# Patient Record
Sex: FEMALE | Race: BLACK OR AFRICAN AMERICAN | NOT HISPANIC OR LATINO
[De-identification: names, ages, dates, MRNs, and addresses within clinical notes are randomized per-mention and may not be internally consistent; named-entity substitution may affect disease eponyms.]

---

## 2020-08-14 ENCOUNTER — APPOINTMENT (OUTPATIENT)
Dept: OBGYN | Facility: CLINIC | Age: 35
End: 2020-08-14
Payer: MEDICAID

## 2020-08-14 PROCEDURE — 0502F SUBSEQUENT PRENATAL CARE: CPT

## 2020-08-17 ENCOUNTER — APPOINTMENT (OUTPATIENT)
Dept: OBGYN | Facility: CLINIC | Age: 35
End: 2020-08-17
Payer: MEDICAID

## 2020-08-17 PROCEDURE — 0502F SUBSEQUENT PRENATAL CARE: CPT

## 2020-08-17 PROCEDURE — 99213 OFFICE O/P EST LOW 20 MIN: CPT

## 2020-08-20 PROBLEM — Z00.00 ENCOUNTER FOR PREVENTIVE HEALTH EXAMINATION: Status: ACTIVE | Noted: 2020-08-20

## 2020-08-21 ENCOUNTER — LABORATORY RESULT (OUTPATIENT)
Age: 35
End: 2020-08-21

## 2020-08-21 ENCOUNTER — APPOINTMENT (OUTPATIENT)
Dept: HEMATOLOGY ONCOLOGY | Facility: CLINIC | Age: 35
End: 2020-08-21
Payer: MEDICAID

## 2020-08-21 VITALS
HEIGHT: 67 IN | DIASTOLIC BLOOD PRESSURE: 75 MMHG | TEMPERATURE: 97.1 F | SYSTOLIC BLOOD PRESSURE: 114 MMHG | HEART RATE: 83 BPM | BODY MASS INDEX: 27.62 KG/M2 | WEIGHT: 176 LBS | RESPIRATION RATE: 14 BRPM

## 2020-08-21 DIAGNOSIS — Z87.898 PERSONAL HISTORY OF OTHER SPECIFIED CONDITIONS: ICD-10-CM

## 2020-08-21 LAB
ALBUMIN SERPL ELPH-MCNC: 3.5 G/DL
ALP BLD-CCNC: 136 U/L
ALT SERPL-CCNC: 7 U/L
ANION GAP SERPL CALC-SCNC: 13 MMOL/L
AST SERPL-CCNC: 15 U/L
BILIRUB SERPL-MCNC: 0.3 MG/DL
BUN SERPL-MCNC: 4 MG/DL
CALCIUM SERPL-MCNC: 9.3 MG/DL
CHLORIDE SERPL-SCNC: 103 MMOL/L
CO2 SERPL-SCNC: 18 MMOL/L
CREAT SERPL-MCNC: 0.6 MG/DL
GLUCOSE SERPL-MCNC: 85 MG/DL
HCT VFR BLD CALC: 25.7 %
HGB BLD-MCNC: 7.6 G/DL
LDH SERPL-CCNC: 231
MCHC RBC-ENTMCNC: 21.2 PG
MCHC RBC-ENTMCNC: 29.6 G/DL
MCV RBC AUTO: 71.8 FL
PLATELET # BLD AUTO: 329 K/UL
PMV BLD: 9 FL
POTASSIUM SERPL-SCNC: 3.7 MMOL/L
PROT SERPL-MCNC: 6.7 G/DL
RBC # BLD: 3.58 M/UL
RBC # FLD: 19.2 %
RETICS # AUTO: 2.9 %
RETICS AGGREG/RBC NFR: 104.2 K/UL
SODIUM SERPL-SCNC: 134 MMOL/L
WBC # FLD AUTO: 11.89 K/UL

## 2020-08-21 PROCEDURE — 99204 OFFICE O/P NEW MOD 45 MIN: CPT

## 2020-08-21 NOTE — ASSESSMENT
[FreeTextEntry1] : 35 year old female multigravida , 32 weeks of gestation , moderately severe microcytic anemia probably from iron deficiency , expected delivery date by C section early October ( placenta previa ? ) . Intolerant to po iron . \par will check ferritin , B12 , and schedule venofer 200mg weekly as needed . Discussed potential adverse effects mainly mild infusion reaction . She will try to switch to slofe if tolerated.

## 2020-08-21 NOTE — PHYSICAL EXAM
[de-identified] : , non tender.  [Normal] : no peripheral adenopathy appreciated [de-identified] : grade 2 / 6 ESM  at left sternal border.

## 2020-08-21 NOTE — HISTORY OF PRESENT ILLNESS
[de-identified] : 35 year old black female refereed by Dr Guille Chapa for anemia . She is 32 weeks gestation and scheduled for elective C section early October ( placenta previa or [facility] ? ) \par CBc from 8/14 shows Hb : 7.6 MCV : 73 RDW : 18.9 , wbc : 12  platelets : 361 normal TSH . She started on feosol 325 mg tid which causes constipation . she complains of fatigue , exertional dyspnea , craving for ice . She denies any abnormal bleeding . One of her pregnancies was complicated by severe post partum bleeding requiring transfusions , she denies menorrhagia , hematochezia .

## 2020-08-24 ENCOUNTER — APPOINTMENT (OUTPATIENT)
Dept: INFUSION THERAPY | Facility: CLINIC | Age: 35
End: 2020-08-24

## 2020-08-24 ENCOUNTER — APPOINTMENT (OUTPATIENT)
Dept: OBGYN | Facility: CLINIC | Age: 35
End: 2020-08-24
Payer: MEDICAID

## 2020-08-24 LAB
FERRITIN SERPL-MCNC: 13 NG/ML
VIT B12 SERPL-MCNC: 279 PG/ML

## 2020-08-24 PROCEDURE — 0502F SUBSEQUENT PRENATAL CARE: CPT

## 2020-08-24 RX ORDER — IRON SUCROSE 20 MG/ML
200 INJECTION, SOLUTION INTRAVENOUS ONCE
Refills: 0 | Status: COMPLETED | OUTPATIENT
Start: 2020-08-24 | End: 2020-08-24

## 2020-08-24 RX ADMIN — IRON SUCROSE 200 MILLIGRAM(S): 20 INJECTION, SOLUTION INTRAVENOUS at 15:35

## 2020-08-24 RX ADMIN — IRON SUCROSE 220 MILLIGRAM(S): 20 INJECTION, SOLUTION INTRAVENOUS at 15:05

## 2020-08-28 ENCOUNTER — OUTPATIENT (OUTPATIENT)
Dept: OUTPATIENT SERVICES | Facility: HOSPITAL | Age: 35
LOS: 1 days | Discharge: HOME | End: 2020-08-28

## 2020-08-28 ENCOUNTER — ASOB RESULT (OUTPATIENT)
Age: 35
End: 2020-08-28

## 2020-08-28 ENCOUNTER — APPOINTMENT (OUTPATIENT)
Dept: ANTEPARTUM | Facility: CLINIC | Age: 35
End: 2020-08-28
Payer: MEDICAID

## 2020-08-28 ENCOUNTER — RESULT CHARGE (OUTPATIENT)
Age: 35
End: 2020-08-28

## 2020-08-28 VITALS
DIASTOLIC BLOOD PRESSURE: 66 MMHG | BODY MASS INDEX: 27.41 KG/M2 | SYSTOLIC BLOOD PRESSURE: 103 MMHG | HEART RATE: 100 BPM | OXYGEN SATURATION: 99 % | TEMPERATURE: 98.4 F | WEIGHT: 175 LBS

## 2020-08-28 DIAGNOSIS — Z86.19 PERSONAL HISTORY OF OTHER INFECTIOUS AND PARASITIC DISEASES: ICD-10-CM

## 2020-08-28 DIAGNOSIS — D64.9 ANEMIA, UNSPECIFIED: ICD-10-CM

## 2020-08-28 DIAGNOSIS — Z82.49 FAMILY HISTORY OF ISCHEMIC HEART DISEASE AND OTHER DISEASES OF THE CIRCULATORY SYSTEM: ICD-10-CM

## 2020-08-28 DIAGNOSIS — O44.03 COMPLETE PLACENTA PREVIA NOS OR WITHOUT HEMORRHAGE, THIRD TRIMESTER: ICD-10-CM

## 2020-08-28 DIAGNOSIS — O34.211 MATERNAL CARE FOR LOW TRANSVERSE SCAR FROM PREVIOUS CESAREAN DELIVERY: ICD-10-CM

## 2020-08-28 LAB
BILIRUB UR QL STRIP: NEGATIVE
CLARITY UR: CLEAR
COLLECTION METHOD: NORMAL
FETAL MOVEMENT: PRESENT
GLUCOSE UR-MCNC: NEGATIVE
HCG UR QL: 0.2 EU/DL
HGB UR QL STRIP.AUTO: NEGATIVE
KETONES UR-MCNC: NEGATIVE
LEUKOCYTE ESTERASE UR QL STRIP: NORMAL
NITRITE UR QL STRIP: NEGATIVE
OB COMMENTS: NORMAL
PH UR STRIP: 6.5
PROT UR STRIP-MCNC: NORMAL
SCHEDULED VISIT: YES
SP GR UR STRIP: 1.01
URINE ALBUMIN/PROTEIN: NORMAL
URINE GLUCOSE: NEGATIVE
URINE KETONES: NEGATIVE
WEEKS GESTATION: 33.2

## 2020-08-28 PROCEDURE — 99214 OFFICE O/P EST MOD 30 MIN: CPT | Mod: 25

## 2020-08-28 PROCEDURE — 76816 OB US FOLLOW-UP PER FETUS: CPT | Mod: 26

## 2020-08-28 NOTE — OB HISTORY
[Pregnancy History] : Vaginal delivery [___] : at [unfilled] weeks GA [ESTHELA: ___] : ESTHELA: [unfilled] [EGA: ___ wks] : EGA: [unfilled] wks

## 2020-08-28 NOTE — PAST MEDICAL HISTORY
[HIV Infection] : no HIV [Exposure To Gonorrhea] : no gonorrhea [Syphilis] : no syphilis [Herpes Simplex] : no genital herpes [Human Papilloma Virus Infection] : no genital warts [Hepatitis, B Virus] : no Hepatitis B [Hepatitis, C Virus] : no Hepatitis C [Trichomoniasis] : no trichomoniasis

## 2020-08-28 NOTE — ACTIVE PROBLEMS
[Diabetes Mellitus] : no diabetes mellitus [Hypertension] : no hypertension [Renal Disease] : no kidney disease, no UTI [Autoimmune Disease] : no autoimmune disease [Heart Disease] : no heart disease [Neurologic Disorder] : no neurologic disorder, no epilepsy [Psychiatric Disorders] : no psychiatric disorders [Depression] : no depression, no post partum depression [Hepatic Disorder] : no hepatitis, no liver disease [Thrombophlebitis] : no varicosities, no phlebitis [Thyroid Disorder] : no thyroid dysfunction [Trauma] : no trauma/violence [Blood Transfusion (___ Ml)] : no history of blood transfusion

## 2020-08-28 NOTE — DISCUSSION/SUMMARY
[FreeTextEntry1] : Placenta previa\par \par Discussed with patient that  section at 36 weeks is indicated for placenta previa to prevent hemorrhage in case of PTL.\par WIth history of  section and current placenta previa, there is 3% risk of placenta accreta.\par \par Anemia\par H/H 7.6/26.6 reticulocyte count  > 2 %\par Patient is following with hem/onc\par s/p IV venofer infusion x1, she is scheduled for 2 more\par \par Prenatal care with Dr. Chapa \par Prenatal labs reviewed\par \par Sonogram today: cephalic, posterior marginal placenta, MVP 6.11cm, EFW 1869g (12%),

## 2020-08-28 NOTE — HISTORY OF PRESENT ILLNESS
[FreeTextEntry1] : 35 year old  at 33 weeks 2 days, by early ultrasound,  referred by Dr. Chapa for placenta previa\par \par Patient feels well. Denies contractions, LOF, vaginal bleeding and reports good fetal movements.

## 2020-08-28 NOTE — END OF VISIT
[] : Resident [FreeTextEntry3] : I discussed the risks and benefits of c/section after 36 weeks of pregnancy.  [Time Spent: ___ minutes] : I have spent [unfilled] minutes of time on the encounter. [>50% of the face to face encounter time was spent on counseling and/or coordination of care for ___] : Greater than 50% of the face to face encounter time was spent on counseling and/or coordination of care for [unfilled]

## 2020-08-31 ENCOUNTER — APPOINTMENT (OUTPATIENT)
Dept: OBGYN | Facility: CLINIC | Age: 35
End: 2020-08-31

## 2020-08-31 ENCOUNTER — APPOINTMENT (OUTPATIENT)
Dept: INFUSION THERAPY | Facility: CLINIC | Age: 35
End: 2020-08-31

## 2020-08-31 ENCOUNTER — APPOINTMENT (OUTPATIENT)
Dept: OBGYN | Facility: CLINIC | Age: 35
End: 2020-08-31
Payer: MEDICAID

## 2020-08-31 DIAGNOSIS — D64.9 ANEMIA, UNSPECIFIED: ICD-10-CM

## 2020-08-31 DIAGNOSIS — O34.211 MATERNAL CARE FOR LOW TRANSVERSE SCAR FROM PREVIOUS CESAREAN DELIVERY: ICD-10-CM

## 2020-08-31 DIAGNOSIS — O44.03 COMPLETE PLACENTA PREVIA NOS OR WITHOUT HEMORRHAGE, THIRD TRIMESTER: ICD-10-CM

## 2020-08-31 DIAGNOSIS — Z3A.33 33 WEEKS GESTATION OF PREGNANCY: ICD-10-CM

## 2020-08-31 PROCEDURE — 0502F SUBSEQUENT PRENATAL CARE: CPT

## 2020-08-31 RX ORDER — IRON SUCROSE 20 MG/ML
200 INJECTION, SOLUTION INTRAVENOUS ONCE
Refills: 0 | Status: COMPLETED | OUTPATIENT
Start: 2020-08-31 | End: 2020-08-31

## 2020-08-31 RX ADMIN — IRON SUCROSE 200 MILLIGRAM(S): 20 INJECTION, SOLUTION INTRAVENOUS at 12:10

## 2020-08-31 RX ADMIN — IRON SUCROSE 220 MILLIGRAM(S): 20 INJECTION, SOLUTION INTRAVENOUS at 11:37

## 2020-09-08 ENCOUNTER — APPOINTMENT (OUTPATIENT)
Dept: INFUSION THERAPY | Facility: CLINIC | Age: 35
End: 2020-09-08

## 2020-09-08 ENCOUNTER — APPOINTMENT (OUTPATIENT)
Dept: OBGYN | Facility: CLINIC | Age: 35
End: 2020-09-08
Payer: MEDICAID

## 2020-09-08 ENCOUNTER — OUTPATIENT (OUTPATIENT)
Dept: OUTPATIENT SERVICES | Facility: HOSPITAL | Age: 35
LOS: 1 days | Discharge: HOME | End: 2020-09-08

## 2020-09-08 DIAGNOSIS — D64.9 ANEMIA, UNSPECIFIED: ICD-10-CM

## 2020-09-08 PROCEDURE — 0502F SUBSEQUENT PRENATAL CARE: CPT

## 2020-09-08 RX ORDER — IRON SUCROSE 20 MG/ML
200 INJECTION, SOLUTION INTRAVENOUS ONCE
Refills: 0 | Status: COMPLETED | OUTPATIENT
Start: 2020-09-08 | End: 2020-09-08

## 2020-09-08 RX ADMIN — IRON SUCROSE 220 MILLIGRAM(S): 20 INJECTION, SOLUTION INTRAVENOUS at 10:55

## 2020-09-08 RX ADMIN — IRON SUCROSE 200 MILLIGRAM(S): 20 INJECTION, SOLUTION INTRAVENOUS at 11:30

## 2020-09-16 ENCOUNTER — APPOINTMENT (OUTPATIENT)
Dept: OBGYN | Facility: CLINIC | Age: 35
End: 2020-09-16
Payer: MEDICAID

## 2020-09-16 PROCEDURE — 0502F SUBSEQUENT PRENATAL CARE: CPT

## 2020-09-22 ENCOUNTER — APPOINTMENT (OUTPATIENT)
Dept: HEMATOLOGY ONCOLOGY | Facility: CLINIC | Age: 35
End: 2020-09-22

## 2020-09-25 ENCOUNTER — APPOINTMENT (OUTPATIENT)
Dept: OBGYN | Facility: CLINIC | Age: 35
End: 2020-09-25
Payer: MEDICAID

## 2020-09-25 PROCEDURE — 0502F SUBSEQUENT PRENATAL CARE: CPT

## 2020-09-28 ENCOUNTER — OUTPATIENT (OUTPATIENT)
Dept: OUTPATIENT SERVICES | Facility: HOSPITAL | Age: 35
LOS: 1 days | Discharge: HOME | End: 2020-09-28

## 2020-09-28 DIAGNOSIS — Z11.59 ENCOUNTER FOR SCREENING FOR OTHER VIRAL DISEASES: ICD-10-CM

## 2020-09-29 ENCOUNTER — APPOINTMENT (OUTPATIENT)
Dept: OBGYN | Facility: CLINIC | Age: 35
End: 2020-09-29
Payer: MEDICAID

## 2020-09-29 PROCEDURE — 59025 FETAL NON-STRESS TEST: CPT

## 2020-09-29 PROCEDURE — 0502F SUBSEQUENT PRENATAL CARE: CPT

## 2020-09-30 ENCOUNTER — INPATIENT (INPATIENT)
Facility: HOSPITAL | Age: 35
LOS: 4 days | Discharge: HOME | End: 2020-10-05
Attending: OBSTETRICS & GYNECOLOGY | Admitting: OBSTETRICS & GYNECOLOGY
Payer: MEDICAID

## 2020-09-30 ENCOUNTER — RESULT REVIEW (OUTPATIENT)
Age: 35
End: 2020-09-30

## 2020-09-30 VITALS — HEART RATE: 82 BPM | DIASTOLIC BLOOD PRESSURE: 75 MMHG | SYSTOLIC BLOOD PRESSURE: 111 MMHG

## 2020-09-30 DIAGNOSIS — Z98.890 OTHER SPECIFIED POSTPROCEDURAL STATES: Chronic | ICD-10-CM

## 2020-09-30 LAB
AMPHET UR-MCNC: NEGATIVE — SIGNIFICANT CHANGE UP
ANION GAP SERPL CALC-SCNC: 11 MMOL/L — SIGNIFICANT CHANGE UP (ref 7–14)
ANION GAP SERPL CALC-SCNC: 11 MMOL/L — SIGNIFICANT CHANGE UP (ref 7–14)
APPEARANCE UR: ABNORMAL
APTT BLD: 28.8 SEC — SIGNIFICANT CHANGE UP (ref 27–39.2)
APTT BLD: 29.1 SEC — SIGNIFICANT CHANGE UP (ref 27–39.2)
APTT BLD: 29.5 SEC — SIGNIFICANT CHANGE UP (ref 27–39.2)
BACTERIA # UR AUTO: ABNORMAL
BARBITURATES UR SCN-MCNC: NEGATIVE — SIGNIFICANT CHANGE UP
BASOPHILS # BLD AUTO: 0.03 K/UL — SIGNIFICANT CHANGE UP (ref 0–0.2)
BASOPHILS # BLD AUTO: 0.03 K/UL — SIGNIFICANT CHANGE UP (ref 0–0.2)
BASOPHILS # BLD AUTO: 0.04 K/UL — SIGNIFICANT CHANGE UP (ref 0–0.2)
BASOPHILS # BLD AUTO: 0.04 K/UL — SIGNIFICANT CHANGE UP (ref 0–0.2)
BASOPHILS NFR BLD AUTO: 0.2 % — SIGNIFICANT CHANGE UP (ref 0–1)
BASOPHILS NFR BLD AUTO: 0.3 % — SIGNIFICANT CHANGE UP (ref 0–1)
BASOPHILS NFR BLD AUTO: 0.3 % — SIGNIFICANT CHANGE UP (ref 0–1)
BASOPHILS NFR BLD AUTO: 0.5 % — SIGNIFICANT CHANGE UP (ref 0–1)
BENZODIAZ UR-MCNC: NEGATIVE — SIGNIFICANT CHANGE UP
BILIRUB UR-MCNC: NEGATIVE — SIGNIFICANT CHANGE UP
BLD GP AB SCN SERPL QL: SIGNIFICANT CHANGE UP
BUN SERPL-MCNC: 6 MG/DL — LOW (ref 10–20)
BUN SERPL-MCNC: 6 MG/DL — LOW (ref 10–20)
BUPRENORPHINE SCREEN, URINE RESULT: NEGATIVE — SIGNIFICANT CHANGE UP
CALCIUM SERPL-MCNC: 8.5 MG/DL — SIGNIFICANT CHANGE UP (ref 8.5–10.1)
CALCIUM SERPL-MCNC: 8.5 MG/DL — SIGNIFICANT CHANGE UP (ref 8.5–10.1)
CHLORIDE SERPL-SCNC: 106 MMOL/L — SIGNIFICANT CHANGE UP (ref 98–110)
CHLORIDE SERPL-SCNC: 108 MMOL/L — SIGNIFICANT CHANGE UP (ref 98–110)
CO2 SERPL-SCNC: 18 MMOL/L — SIGNIFICANT CHANGE UP (ref 17–32)
CO2 SERPL-SCNC: 19 MMOL/L — SIGNIFICANT CHANGE UP (ref 17–32)
COCAINE METAB.OTHER UR-MCNC: NEGATIVE — SIGNIFICANT CHANGE UP
COLOR SPEC: YELLOW — SIGNIFICANT CHANGE UP
CREAT SERPL-MCNC: 0.5 MG/DL — LOW (ref 0.7–1.5)
CREAT SERPL-MCNC: 0.5 MG/DL — LOW (ref 0.7–1.5)
DIFF PNL FLD: NEGATIVE — SIGNIFICANT CHANGE UP
EOSINOPHIL # BLD AUTO: 0.01 K/UL — SIGNIFICANT CHANGE UP (ref 0–0.7)
EOSINOPHIL # BLD AUTO: 0.01 K/UL — SIGNIFICANT CHANGE UP (ref 0–0.7)
EOSINOPHIL # BLD AUTO: 0.04 K/UL — SIGNIFICANT CHANGE UP (ref 0–0.7)
EOSINOPHIL # BLD AUTO: 0.06 K/UL — SIGNIFICANT CHANGE UP (ref 0–0.7)
EOSINOPHIL NFR BLD AUTO: 0.1 % — SIGNIFICANT CHANGE UP (ref 0–8)
EOSINOPHIL NFR BLD AUTO: 0.1 % — SIGNIFICANT CHANGE UP (ref 0–8)
EOSINOPHIL NFR BLD AUTO: 0.4 % — SIGNIFICANT CHANGE UP (ref 0–8)
EOSINOPHIL NFR BLD AUTO: 0.8 % — SIGNIFICANT CHANGE UP (ref 0–8)
EPI CELLS # UR: 13 /HPF — HIGH (ref 0–5)
FENTANYL UR QL: NEGATIVE — SIGNIFICANT CHANGE UP
FIBRINOGEN PPP-MCNC: 477 MG/DL — SIGNIFICANT CHANGE UP (ref 204.4–570.6)
FIBRINOGEN PPP-MCNC: 550 MG/DL — SIGNIFICANT CHANGE UP (ref 204.4–570.6)
FIBRINOGEN PPP-MCNC: 599 MG/DL — HIGH (ref 204.4–570.6)
GLUCOSE SERPL-MCNC: 82 MG/DL — SIGNIFICANT CHANGE UP (ref 70–99)
GLUCOSE SERPL-MCNC: 82 MG/DL — SIGNIFICANT CHANGE UP (ref 70–99)
GLUCOSE UR QL: NEGATIVE — SIGNIFICANT CHANGE UP
HCT VFR BLD CALC: 26.2 % — LOW (ref 37–47)
HCT VFR BLD CALC: 31.8 % — LOW (ref 37–47)
HCT VFR BLD CALC: 32.8 % — LOW (ref 37–47)
HCT VFR BLD CALC: 36.4 % — LOW (ref 37–47)
HGB BLD-MCNC: 10.4 G/DL — LOW (ref 12–16)
HGB BLD-MCNC: 10.6 G/DL — LOW (ref 12–16)
HGB BLD-MCNC: 11.6 G/DL — LOW (ref 12–16)
HGB BLD-MCNC: 8.7 G/DL — LOW (ref 12–16)
HYALINE CASTS # UR AUTO: 4 /LPF — SIGNIFICANT CHANGE UP (ref 0–7)
IMM GRANULOCYTES NFR BLD AUTO: 0.4 % — HIGH (ref 0.1–0.3)
IMM GRANULOCYTES NFR BLD AUTO: 0.5 % — HIGH (ref 0.1–0.3)
IMM GRANULOCYTES NFR BLD AUTO: 0.7 % — HIGH (ref 0.1–0.3)
IMM GRANULOCYTES NFR BLD AUTO: 1 % — HIGH (ref 0.1–0.3)
INR BLD: 0.99 RATIO — SIGNIFICANT CHANGE UP (ref 0.65–1.3)
INR BLD: 1.01 RATIO — SIGNIFICANT CHANGE UP (ref 0.65–1.3)
INR BLD: 1.03 RATIO — SIGNIFICANT CHANGE UP (ref 0.65–1.3)
KETONES UR-MCNC: NEGATIVE — SIGNIFICANT CHANGE UP
L&D DRUG SCREEN, URINE: SIGNIFICANT CHANGE UP
LEUKOCYTE ESTERASE UR-ACNC: ABNORMAL
LYMPHOCYTES # BLD AUTO: 1.09 K/UL — LOW (ref 1.2–3.4)
LYMPHOCYTES # BLD AUTO: 1.09 K/UL — LOW (ref 1.2–3.4)
LYMPHOCYTES # BLD AUTO: 1.28 K/UL — SIGNIFICANT CHANGE UP (ref 1.2–3.4)
LYMPHOCYTES # BLD AUTO: 1.48 K/UL — SIGNIFICANT CHANGE UP (ref 1.2–3.4)
LYMPHOCYTES # BLD AUTO: 14 % — LOW (ref 20.5–51.1)
LYMPHOCYTES # BLD AUTO: 18.6 % — LOW (ref 20.5–51.1)
LYMPHOCYTES # BLD AUTO: 6.3 % — LOW (ref 20.5–51.1)
LYMPHOCYTES # BLD AUTO: 7 % — LOW (ref 20.5–51.1)
MCHC RBC-ENTMCNC: 26.7 PG — LOW (ref 27–31)
MCHC RBC-ENTMCNC: 26.9 PG — LOW (ref 27–31)
MCHC RBC-ENTMCNC: 27.7 PG — SIGNIFICANT CHANGE UP (ref 27–31)
MCHC RBC-ENTMCNC: 28.3 PG — SIGNIFICANT CHANGE UP (ref 27–31)
MCHC RBC-ENTMCNC: 31.9 G/DL — LOW (ref 32–37)
MCHC RBC-ENTMCNC: 32.3 G/DL — SIGNIFICANT CHANGE UP (ref 32–37)
MCHC RBC-ENTMCNC: 32.7 G/DL — SIGNIFICANT CHANGE UP (ref 32–37)
MCHC RBC-ENTMCNC: 33.2 G/DL — SIGNIFICANT CHANGE UP (ref 32–37)
MCV RBC AUTO: 83.2 FL — SIGNIFICANT CHANGE UP (ref 81–99)
MCV RBC AUTO: 83.7 FL — SIGNIFICANT CHANGE UP (ref 81–99)
MCV RBC AUTO: 84.6 FL — SIGNIFICANT CHANGE UP (ref 81–99)
MCV RBC AUTO: 85.3 FL — SIGNIFICANT CHANGE UP (ref 81–99)
METHADONE UR-MCNC: NEGATIVE — SIGNIFICANT CHANGE UP
MONOCYTES # BLD AUTO: 0.56 K/UL — SIGNIFICANT CHANGE UP (ref 0.1–0.6)
MONOCYTES # BLD AUTO: 0.71 K/UL — HIGH (ref 0.1–0.6)
MONOCYTES # BLD AUTO: 0.98 K/UL — HIGH (ref 0.1–0.6)
MONOCYTES # BLD AUTO: 1.14 K/UL — HIGH (ref 0.1–0.6)
MONOCYTES NFR BLD AUTO: 5.6 % — SIGNIFICANT CHANGE UP (ref 1.7–9.3)
MONOCYTES NFR BLD AUTO: 6.1 % — SIGNIFICANT CHANGE UP (ref 1.7–9.3)
MONOCYTES NFR BLD AUTO: 7.3 % — SIGNIFICANT CHANGE UP (ref 1.7–9.3)
MONOCYTES NFR BLD AUTO: 8.9 % — SIGNIFICANT CHANGE UP (ref 1.7–9.3)
NEUTROPHILS # BLD AUTO: 13.22 K/UL — HIGH (ref 1.4–6.5)
NEUTROPHILS # BLD AUTO: 15.16 K/UL — HIGH (ref 1.4–6.5)
NEUTROPHILS # BLD AUTO: 5.59 K/UL — SIGNIFICANT CHANGE UP (ref 1.4–6.5)
NEUTROPHILS # BLD AUTO: 7.17 K/UL — HIGH (ref 1.4–6.5)
NEUTROPHILS NFR BLD AUTO: 70.2 % — SIGNIFICANT CHANGE UP (ref 42.2–75.2)
NEUTROPHILS NFR BLD AUTO: 78.7 % — HIGH (ref 42.2–75.2)
NEUTROPHILS NFR BLD AUTO: 84.9 % — HIGH (ref 42.2–75.2)
NEUTROPHILS NFR BLD AUTO: 87.1 % — HIGH (ref 42.2–75.2)
NITRITE UR-MCNC: NEGATIVE — SIGNIFICANT CHANGE UP
NRBC # BLD: 0 /100 WBCS — SIGNIFICANT CHANGE UP (ref 0–0)
OPIATES UR-MCNC: NEGATIVE — SIGNIFICANT CHANGE UP
OXYCODONE UR-MCNC: NEGATIVE — SIGNIFICANT CHANGE UP
PCP UR-MCNC: NEGATIVE — SIGNIFICANT CHANGE UP
PH UR: 7 — SIGNIFICANT CHANGE UP (ref 5–8)
PLATELET # BLD AUTO: 198 K/UL — SIGNIFICANT CHANGE UP (ref 130–400)
PLATELET # BLD AUTO: 211 K/UL — SIGNIFICANT CHANGE UP (ref 130–400)
PLATELET # BLD AUTO: 229 K/UL — SIGNIFICANT CHANGE UP (ref 130–400)
PLATELET # BLD AUTO: 247 K/UL — SIGNIFICANT CHANGE UP (ref 130–400)
POTASSIUM SERPL-MCNC: 3.8 MMOL/L — SIGNIFICANT CHANGE UP (ref 3.5–5)
POTASSIUM SERPL-MCNC: 4 MMOL/L — SIGNIFICANT CHANGE UP (ref 3.5–5)
POTASSIUM SERPL-SCNC: 3.8 MMOL/L — SIGNIFICANT CHANGE UP (ref 3.5–5)
POTASSIUM SERPL-SCNC: 4 MMOL/L — SIGNIFICANT CHANGE UP (ref 3.5–5)
PRENATAL SYPHILIS TEST: SIGNIFICANT CHANGE UP
PROPOXYPHENE QUALITATIVE URINE RESULT: NEGATIVE — SIGNIFICANT CHANGE UP
PROT UR-MCNC: ABNORMAL
PROTHROM AB SERPL-ACNC: 11.4 SEC — SIGNIFICANT CHANGE UP (ref 9.95–12.87)
PROTHROM AB SERPL-ACNC: 11.6 SEC — SIGNIFICANT CHANGE UP (ref 9.95–12.87)
PROTHROM AB SERPL-ACNC: 11.9 SEC — SIGNIFICANT CHANGE UP (ref 9.95–12.87)
RBC # BLD: 3.07 M/UL — LOW (ref 4.2–5.4)
RBC # BLD: 3.76 M/UL — LOW (ref 4.2–5.4)
RBC # BLD: 3.94 M/UL — LOW (ref 4.2–5.4)
RBC # BLD: 4.35 M/UL — SIGNIFICANT CHANGE UP (ref 4.2–5.4)
RBC # FLD: 23.4 % — HIGH (ref 11.5–14.5)
RBC # FLD: 25 % — HIGH (ref 11.5–14.5)
RBC # FLD: 25.2 % — HIGH (ref 11.5–14.5)
RBC # FLD: SIGNIFICANT CHANGE UP % (ref 11.5–14.5)
RBC CASTS # UR COMP ASSIST: 4 /HPF — SIGNIFICANT CHANGE UP (ref 0–4)
SODIUM SERPL-SCNC: 135 MMOL/L — SIGNIFICANT CHANGE UP (ref 135–146)
SODIUM SERPL-SCNC: 138 MMOL/L — SIGNIFICANT CHANGE UP (ref 135–146)
SP GR SPEC: 1.02 — SIGNIFICANT CHANGE UP (ref 1.01–1.03)
UROBILINOGEN FLD QL: SIGNIFICANT CHANGE UP
WBC # BLD: 15.57 K/UL — HIGH (ref 4.8–10.8)
WBC # BLD: 17.39 K/UL — HIGH (ref 4.8–10.8)
WBC # BLD: 7.96 K/UL — SIGNIFICANT CHANGE UP (ref 4.8–10.8)
WBC # BLD: 9.13 K/UL — SIGNIFICANT CHANGE UP (ref 4.8–10.8)
WBC # FLD AUTO: 15.57 K/UL — HIGH (ref 4.8–10.8)
WBC # FLD AUTO: 17.39 K/UL — HIGH (ref 4.8–10.8)
WBC # FLD AUTO: 7.96 K/UL — SIGNIFICANT CHANGE UP (ref 4.8–10.8)
WBC # FLD AUTO: 9.13 K/UL — SIGNIFICANT CHANGE UP (ref 4.8–10.8)
WBC UR QL: 6 /HPF — HIGH (ref 0–5)

## 2020-09-30 PROCEDURE — 76937 US GUIDE VASCULAR ACCESS: CPT | Mod: 26

## 2020-09-30 PROCEDURE — 37244 VASC EMBOLIZE/OCCLUDE BLEED: CPT

## 2020-09-30 PROCEDURE — 59510 CESAREAN DELIVERY: CPT | Mod: U7

## 2020-09-30 PROCEDURE — 36247 INS CATH ABD/L-EXT ART 3RD: CPT | Mod: 59

## 2020-09-30 PROCEDURE — 88307 TISSUE EXAM BY PATHOLOGIST: CPT | Mod: 26

## 2020-09-30 RX ORDER — SODIUM CHLORIDE 9 MG/ML
1000 INJECTION, SOLUTION INTRAVENOUS ONCE
Refills: 0 | Status: DISCONTINUED | OUTPATIENT
Start: 2020-09-30 | End: 2020-09-30

## 2020-09-30 RX ORDER — ONDANSETRON 8 MG/1
4 TABLET, FILM COATED ORAL EVERY 6 HOURS
Refills: 0 | Status: DISCONTINUED | OUTPATIENT
Start: 2020-09-30 | End: 2020-10-05

## 2020-09-30 RX ORDER — OXYCODONE HYDROCHLORIDE 5 MG/1
5 TABLET ORAL
Refills: 0 | Status: COMPLETED | OUTPATIENT
Start: 2020-09-30 | End: 2020-10-07

## 2020-09-30 RX ORDER — SODIUM CHLORIDE 9 MG/ML
1000 INJECTION, SOLUTION INTRAVENOUS
Refills: 0 | Status: DISCONTINUED | OUTPATIENT
Start: 2020-09-30 | End: 2020-10-05

## 2020-09-30 RX ORDER — HYDROMORPHONE HYDROCHLORIDE 2 MG/ML
0.5 INJECTION INTRAMUSCULAR; INTRAVENOUS; SUBCUTANEOUS
Refills: 0 | Status: DISCONTINUED | OUTPATIENT
Start: 2020-09-30 | End: 2020-10-05

## 2020-09-30 RX ORDER — INFLUENZA VIRUS VACCINE 15; 15; 15; 15 UG/.5ML; UG/.5ML; UG/.5ML; UG/.5ML
0.5 SUSPENSION INTRAMUSCULAR ONCE
Refills: 0 | Status: COMPLETED | OUTPATIENT
Start: 2020-09-30 | End: 2020-09-30

## 2020-09-30 RX ORDER — FAMOTIDINE 10 MG/ML
20 INJECTION INTRAVENOUS ONCE
Refills: 0 | Status: COMPLETED | OUTPATIENT
Start: 2020-09-30 | End: 2020-09-30

## 2020-09-30 RX ORDER — METOCLOPRAMIDE HCL 10 MG
10 TABLET ORAL ONCE
Refills: 0 | Status: COMPLETED | OUTPATIENT
Start: 2020-09-30 | End: 2020-09-30

## 2020-09-30 RX ORDER — SODIUM CHLORIDE 9 MG/ML
1000 INJECTION, SOLUTION INTRAVENOUS
Refills: 0 | Status: DISCONTINUED | OUTPATIENT
Start: 2020-09-30 | End: 2020-09-30

## 2020-09-30 RX ORDER — ACETAMINOPHEN 500 MG
1000 TABLET ORAL ONCE
Refills: 0 | Status: COMPLETED | OUTPATIENT
Start: 2020-09-30 | End: 2020-09-30

## 2020-09-30 RX ORDER — IBUPROFEN 200 MG
600 TABLET ORAL EVERY 6 HOURS
Refills: 0 | Status: COMPLETED | OUTPATIENT
Start: 2020-09-30 | End: 2021-08-29

## 2020-09-30 RX ORDER — SODIUM CHLORIDE 9 MG/ML
1000 INJECTION INTRAMUSCULAR; INTRAVENOUS; SUBCUTANEOUS
Refills: 0 | Status: DISCONTINUED | OUTPATIENT
Start: 2020-09-30 | End: 2020-10-03

## 2020-09-30 RX ORDER — MAGNESIUM HYDROXIDE 400 MG/1
30 TABLET, CHEWABLE ORAL
Refills: 0 | Status: DISCONTINUED | OUTPATIENT
Start: 2020-09-30 | End: 2020-10-05

## 2020-09-30 RX ORDER — NALOXONE HYDROCHLORIDE 4 MG/.1ML
0.1 SPRAY NASAL
Refills: 0 | Status: DISCONTINUED | OUTPATIENT
Start: 2020-09-30 | End: 2020-10-05

## 2020-09-30 RX ORDER — TRANEXAMIC ACID 100 MG/ML
1000 INJECTION, SOLUTION INTRAVENOUS ONCE
Refills: 0 | Status: DISCONTINUED | OUTPATIENT
Start: 2020-09-30 | End: 2020-10-05

## 2020-09-30 RX ORDER — CITRIC ACID/SODIUM CITRATE 300-500 MG
30 SOLUTION, ORAL ORAL ONCE
Refills: 0 | Status: COMPLETED | OUTPATIENT
Start: 2020-09-30 | End: 2020-09-30

## 2020-09-30 RX ORDER — DEXAMETHASONE 0.5 MG/5ML
4 ELIXIR ORAL EVERY 6 HOURS
Refills: 0 | Status: DISCONTINUED | OUTPATIENT
Start: 2020-09-30 | End: 2020-10-05

## 2020-09-30 RX ORDER — KETOROLAC TROMETHAMINE 30 MG/ML
30 SYRINGE (ML) INJECTION EVERY 6 HOURS
Refills: 0 | Status: DISCONTINUED | OUTPATIENT
Start: 2020-09-30 | End: 2020-09-30

## 2020-09-30 RX ORDER — OXYTOCIN 10 UNIT/ML
333.33 VIAL (ML) INJECTION
Qty: 20 | Refills: 0 | Status: DISCONTINUED | OUTPATIENT
Start: 2020-09-30 | End: 2020-10-05

## 2020-09-30 RX ORDER — DIPHENHYDRAMINE HCL 50 MG
25 CAPSULE ORAL EVERY 6 HOURS
Refills: 0 | Status: DISCONTINUED | OUTPATIENT
Start: 2020-09-30 | End: 2020-10-05

## 2020-09-30 RX ORDER — LANOLIN
1 OINTMENT (GRAM) TOPICAL EVERY 6 HOURS
Refills: 0 | Status: DISCONTINUED | OUTPATIENT
Start: 2020-09-30 | End: 2020-10-05

## 2020-09-30 RX ORDER — ACETAMINOPHEN 500 MG
975 TABLET ORAL
Refills: 0 | Status: DISCONTINUED | OUTPATIENT
Start: 2020-09-30 | End: 2020-10-05

## 2020-09-30 RX ORDER — MORPHINE SULFATE 50 MG/1
0.2 CAPSULE, EXTENDED RELEASE ORAL ONCE
Refills: 0 | Status: DISCONTINUED | OUTPATIENT
Start: 2020-09-30 | End: 2020-10-05

## 2020-09-30 RX ORDER — SIMETHICONE 80 MG/1
80 TABLET, CHEWABLE ORAL EVERY 4 HOURS
Refills: 0 | Status: DISCONTINUED | OUTPATIENT
Start: 2020-09-30 | End: 2020-10-05

## 2020-09-30 RX ORDER — CEFAZOLIN SODIUM 1 G
2000 VIAL (EA) INJECTION ONCE
Refills: 0 | Status: COMPLETED | OUTPATIENT
Start: 2020-09-30 | End: 2020-09-30

## 2020-09-30 RX ADMIN — SODIUM CHLORIDE 125 MILLILITER(S): 9 INJECTION INTRAMUSCULAR; INTRAVENOUS; SUBCUTANEOUS at 22:33

## 2020-09-30 RX ADMIN — Medication 100 MILLIGRAM(S): at 11:27

## 2020-09-30 RX ADMIN — Medication 10 MILLIGRAM(S): at 11:24

## 2020-09-30 RX ADMIN — FAMOTIDINE 20 MILLIGRAM(S): 10 INJECTION INTRAVENOUS at 11:22

## 2020-09-30 RX ADMIN — SIMETHICONE 80 MILLIGRAM(S): 80 TABLET, CHEWABLE ORAL at 23:53

## 2020-09-30 RX ADMIN — Medication 30 MILLILITER(S): at 11:21

## 2020-09-30 RX ADMIN — Medication 400 MILLIGRAM(S): at 16:13

## 2020-09-30 RX ADMIN — Medication 400 MILLIGRAM(S): at 23:54

## 2020-09-30 NOTE — OB PROVIDER H&P - ASSESSMENT
36 y/o  at 38w0d, GBS positive, anemia, h/o blood transfusion for PPH, with marginal previa for scheduled repeat c/s  - admit to L&D  - admission labs  - ancef 2g  - NPO  - skin prep  - anesthesia and peds aware  - crossed for 2u pRBC, on hold for OR  - continuous EFM/toco  - patient on call to OR    Dr. Garcia and Dr. Chapa aware

## 2020-09-30 NOTE — OB PROVIDER H&P - NSHPSOCIALHISTORY_GEN_ALL_CORE
Smoking - denies  Alcohol use- Denies  Illicit drug use - h/o marijuana use, last time she used was 3 months ago

## 2020-09-30 NOTE — CONSULT NOTE ADULT - SUBJECTIVE AND OBJECTIVE BOX
INTERVENTIONAL RADIOLOGY CONSULT:     Procedure Requested: Uterine artery embolization    HPI:  34 y/o  at 38w0d, ESTHELA 10/14/20, dated by LMP c/w first trimester sonogram, presents for scheduled c/s for marginal previa. Pregnancy complicated with anemia. Patient tested positive for THC in pregnancy, last time she smoked marijuana was 3 months. Patient had an elevated GCT but normal GTT. She has a h/o of blood transfusion with P3 for postpartum hemorrhage for retained placenta. H/o c/s x1 for non reassuring fetal heart rate. Denies any other complications, LOF, vaginal bleeding, contractions, urinary symptoms. Reports good fetal movement. GBS positive    Patient signed BTL, but does not desire it at this time.  (30 Sep 2020 10:21)      PAST MEDICAL & SURGICAL HISTORY:  H/O dilation and curettage     delivery delivered        MEDICATIONS  (STANDING):  acetaminophen   Tablet .. 975 milliGRAM(s) Oral <User Schedule>  ibuprofen  Tablet. 600 milliGRAM(s) Oral every 6 hours  influenza   Vaccine 0.5 milliLiter(s) IntraMuscular once  ketorolac   Injectable 30 milliGRAM(s) IV Push every 6 hours  lactated ringers. 1000 milliLiter(s) (125 mL/Hr) IV Continuous <Continuous>  morphine PF Spinal 0.2 milliGRAM(s) IntraThecal. once  oxytocin Infusion 333.333 milliUNIT(s)/Min (1000 mL/Hr) IV Continuous <Continuous>  simethicone 80 milliGRAM(s) Chew every 4 hours  tranexamic acid IVPB 1000 milliGRAM(s) IV Intermittent once    MEDICATIONS  (PRN):  dexAMETHasone  Injectable 4 milliGRAM(s) IV Push every 6 hours PRN Nausea  diphenhydrAMINE 25 milliGRAM(s) Oral every 6 hours PRN Itching  HYDROmorphone  Injectable 0.5 milliGRAM(s) IV Push every 10 minutes PRN Moderate Pain (4 - 6)  lanolin Ointment 1 Application(s) Topical every 6 hours PRN Sore Nipples  magnesium hydroxide Suspension 30 milliLiter(s) Oral two times a day PRN Constipation  naloxone Injectable 0.1 milliGRAM(s) IV Push every 3 minutes PRN For ANY of the following changes in patient status:  A. Breaths Per Minute LESS THAN 10, B. Oxygen saturation LESS THAN 90%, C. Sedation score of 6 for Stop After: 4 Times  ondansetron Injectable 4 milliGRAM(s) IV Push every 6 hours PRN Nausea  oxyCODONE    IR 5 milliGRAM(s) Oral every 3 hours PRN Moderate to Severe Pain (4-10)      Allergies    No Known Allergies    Intolerances    Vital Signs Last 24 Hrs  T(C): 36.7 (30 Sep 2020 13:37), Max: 36.8 (30 Sep 2020 10:02)  T(F): 98 (30 Sep 2020 13:37), Max: 98.2 (30 Sep 2020 10:02)  HR: 136 (30 Sep 2020 18:05) (59 - 139)  BP: 103/73 (30 Sep 2020 18:03) (93/58 - 116/64)  BP(mean): --  RR: 18 (30 Sep 2020 10:10) (18 - 18)  SpO2: 98% (30 Sep 2020 18:00) (89% - 100%)      Labs:                         10.4   15.57 )-----------( 247      ( 30 Sep 2020 17:45 )             31.8     09    138  |  108  |  6<L>  ----------------------------<  82  3.8   |  19  |  0.5<L>    Ca    8.5      30 Sep 2020 14:30      PT/INR - ( 30 Sep 2020 14:30 )   PT: 11.60 sec;   INR: 1.01 ratio         PTT - ( 30 Sep 2020 14:30 )  PTT:28.8 sec    Pertinent labs:                      10.4   15.57 )-----------( 247      ( 30 Sep 2020 17:45 )             31.8       09-30    138  |  108  |  6<L>  ----------------------------<  82  3.8   |  19  |  0.5<L>    Ca    8.5      30 Sep 2020 14:30        PT/INR - ( 30 Sep 2020 14:30 )   PT: 11.60 sec;   INR: 1.01 ratio         PTT - ( 30 Sep 2020 14:30 )  PTT:28.8 sec    Radiology & Additional Studies:     Radiology imaging reviewed.       ASSESSMENT/ PLAN:     35F s/p Caesarean section with intra-operative hemorrhage and persistent vaginal bleeding. Uterine artery embolization requested. Patient seen at bedside and case discussed with OB team. Will plan for uterine artery embolization with conscious sedation. Risks, benefits, and alternatives to treatment discussed. All questions answered with understanding. Patient agreeable to intervention.    Thank you for the courtesy of this consult, please call [phone] with any further questions.    INTERVENTIONAL RADIOLOGY CONSULT:     Procedure Requested: Uterine artery embolization    HPI:  36 y/o  at 38w0d, ESTHELA 10/14/20, dated by LMP c/w first trimester sonogram, presents for scheduled c/s for marginal previa. Pregnancy complicated with anemia. Patient tested positive for THC in pregnancy, last time she smoked marijuana was 3 months. Patient had an elevated GCT but normal GTT. She has a h/o of blood transfusion with P3 for postpartum hemorrhage for retained placenta. H/o c/s x1 for non reassuring fetal heart rate. Denies any other complications, LOF, vaginal bleeding, contractions, urinary symptoms. Reports good fetal movement. GBS positive    Patient signed BTL, but does not desire it at this time.  (30 Sep 2020 10:21)      PAST MEDICAL & SURGICAL HISTORY:  H/O dilation and curettage     delivery delivered        MEDICATIONS  (STANDING):  acetaminophen   Tablet .. 975 milliGRAM(s) Oral <User Schedule>  ibuprofen  Tablet. 600 milliGRAM(s) Oral every 6 hours  influenza   Vaccine 0.5 milliLiter(s) IntraMuscular once  ketorolac   Injectable 30 milliGRAM(s) IV Push every 6 hours  lactated ringers. 1000 milliLiter(s) (125 mL/Hr) IV Continuous <Continuous>  morphine PF Spinal 0.2 milliGRAM(s) IntraThecal. once  oxytocin Infusion 333.333 milliUNIT(s)/Min (1000 mL/Hr) IV Continuous <Continuous>  simethicone 80 milliGRAM(s) Chew every 4 hours  tranexamic acid IVPB 1000 milliGRAM(s) IV Intermittent once    MEDICATIONS  (PRN):  dexAMETHasone  Injectable 4 milliGRAM(s) IV Push every 6 hours PRN Nausea  diphenhydrAMINE 25 milliGRAM(s) Oral every 6 hours PRN Itching  HYDROmorphone  Injectable 0.5 milliGRAM(s) IV Push every 10 minutes PRN Moderate Pain (4 - 6)  lanolin Ointment 1 Application(s) Topical every 6 hours PRN Sore Nipples  magnesium hydroxide Suspension 30 milliLiter(s) Oral two times a day PRN Constipation  naloxone Injectable 0.1 milliGRAM(s) IV Push every 3 minutes PRN For ANY of the following changes in patient status:  A. Breaths Per Minute LESS THAN 10, B. Oxygen saturation LESS THAN 90%, C. Sedation score of 6 for Stop After: 4 Times  ondansetron Injectable 4 milliGRAM(s) IV Push every 6 hours PRN Nausea  oxyCODONE    IR 5 milliGRAM(s) Oral every 3 hours PRN Moderate to Severe Pain (4-10)      Allergies    No Known Allergies    Intolerances    Vital Signs Last 24 Hrs  T(C): 36.7 (30 Sep 2020 13:37), Max: 36.8 (30 Sep 2020 10:02)  T(F): 98 (30 Sep 2020 13:37), Max: 98.2 (30 Sep 2020 10:02)  HR: 136 (30 Sep 2020 18:05) (59 - 139)  BP: 103/73 (30 Sep 2020 18:03) (93/58 - 116/64)  BP(mean): --  RR: 18 (30 Sep 2020 10:10) (18 - 18)  SpO2: 98% (30 Sep 2020 18:00) (89% - 100%)      Labs:                         10.4   15.57 )-----------( 247      ( 30 Sep 2020 17:45 )             31.8     09    138  |  108  |  6<L>  ----------------------------<  82  3.8   |  19  |  0.5<L>    Ca    8.5      30 Sep 2020 14:30      PT/INR - ( 30 Sep 2020 14:30 )   PT: 11.60 sec;   INR: 1.01 ratio         PTT - ( 30 Sep 2020 14:30 )  PTT:28.8 sec    Pertinent labs:                      10.4   15.57 )-----------( 247      ( 30 Sep 2020 17:45 )             31.8       09-30    138  |  108  |  6<L>  ----------------------------<  82  3.8   |  19  |  0.5<L>    Ca    8.5      30 Sep 2020 14:30        PT/INR - ( 30 Sep 2020 14:30 )   PT: 11.60 sec;   INR: 1.01 ratio         PTT - ( 30 Sep 2020 14:30 )  PTT:28.8 sec    Radiology & Additional Studies:     Radiology imaging reviewed.       ASSESSMENT/ PLAN:     35F s/p Caesarean section with intra-operative hemorrhage and persistent vaginal bleeding. Interventional Radiology was consulted for the possibility of uterine artery embolization. Patient seen at bedside and case discussed with OB team. Will plan for uterine artery embolization with conscious sedation. Risks, benefits, and alternatives to treatment discussed. All questions answered with understanding. Patient agreeable to intervention.    Thank you for the courtesy of this consult, please call [phone] with any further questions.

## 2020-09-30 NOTE — BRIEF OPERATIVE NOTE - NSICDXBRIEFPOSTOP_GEN_ALL_CORE_FT
POST-OP DIAGNOSIS:  History of  section 30-Sep-2020 14:43:22  Carmelina Garcia  Placenta previa 30-Sep-2020 14:43:31  Carmelina Garcia  Term pregnancy 30-Sep-2020 14:43:16  Carmelina Garcia

## 2020-09-30 NOTE — OB RN PREOPERATIVE CHECKLIST - NS PREOP CHK MONITOR ANESTHESIA CONSENT
[address] at [facility]  [phone]  Phone call placed to pt to remind pt to have labs drawn prior to her follow up appointment with . Patients  reported she would like to cancel upcoming appointment and did not want to reschedule at this time. He reported \" she is doing well and not taking iron at this time\". She follows up with - PCP routinely. We will be on standby.  informed. done

## 2020-09-30 NOTE — CHART NOTE - NSCHARTNOTEFT_GEN_A_CORE
PACU ANESTHESIA ADMISSION NOTE      Procedure: Primary  section    Post op diagnosis:  Placenta previa, History of  section, Term pregnancy    _x___  Patent Airway    _x___  Full return of protective reflexes    __x__  NMB regressing    Vitals:  T(C): 97.8 F  HR: 88  BP: 125/65  RR: 12  SpO2: 99%    Mental Status:  _x___ Awake   __x___ Alert   _____ Drowsy   _____ Sedated    Nausea/Vomiting:  _x___ NO  ______Yes,   See Post - Op Orders          Pain Scale (0-10):  _____    Treatment: ____ None    __x__ See Post - Op/PCA Orders    Post - Operative Fluids:   ____ Oral   __x__ See Post - Op Orders    Plan: Discharge:   ____Home       __x___Floor     _____Critical Care    _____  Other:_________________    Comments: uneventful anesthesia course no complications. Vitals stable. Pt transferred to L&D PACU

## 2020-09-30 NOTE — PROGRESS NOTE ADULT - SUBJECTIVE AND OBJECTIVE BOX
INTERVENTIONAL RADIOLOGY BRIEF-OPERATIVE NOTE    Procedure: bilateral uterine artery embolization    Pre-Op Diagnosis: postpartum hemorrhage    Post-Op Diagnosis: same    Attending: Devin  Resident: Natividad    Anesthesia (type):  [ ] General Anesthesia  [x ] Sedation  [ ] Spinal Anesthesia  [ x] Local/Regional    Contrast: Optiray intravenous    Estimated Blood Loss: Minimal, < 20 cc    Condition:   [ ] Critical  [ ] Serious  [ ] Fair   [ X] Good    Findings/Follow up Plan of Care: pelvic angiogram performed with bilateral uterine arteries visualized. thrombin gelfoam embolization of bilateral uterine arteries performed to reduced distal flow. patient tolerated procedure without complication. follow up with obstetrics for further management of postpartum care. keep patient supine for 4 hours post-procedurally. Right groin access with Mynx closure device.    Specimens Removed: none    Implants: none    Complications: none    Disposition: PACU      Please call Interventional Radiology [phone] with any questions, concerns, or issues.         INTERVENTIONAL RADIOLOGY BRIEF-OPERATIVE NOTE    Procedure: bilateral uterine artery embolization    Pre-Op Diagnosis: postpartum hemorrhage    Post-Op Diagnosis: same    Attending: Devin  Resident: Natividad    Anesthesia (type):  [ ] General Anesthesia  [ ] Sedation  [ ] Spinal Anesthesia  [ x] Local/Regional    Contrast: Optiray intravenous    Estimated Blood Loss: Minimal, < 20 cc    Condition:   [ ] Critical  [ ] Serious  [ ] Fair   [ X] Good    Findings/Follow up Plan of Care: pelvic angiogram performed with bilateral uterine arteries visualized. thrombin gelfoam embolization of bilateral uterine arteries performed to reduced distal flow. patient tolerated procedure without complication. follow up with obstetrics for further management of postpartum care. keep patient supine for 4 hours post-procedurally. Right groin access with Mynx closure device.    Specimens Removed: none    Implants: none    Complications: none    Disposition: PACU      Please call Interventional Radiology [phone] with any questions, concerns, or issues.

## 2020-09-30 NOTE — OB RN PATIENT PROFILE - WEIGHT: TOTAL WEIGHT IN KG
[Arthralgia] : arthralgia [Joint Stiffness] : joint stiffness [Joint Pain] : joint pain [Joint Swelling] : no joint swelling [Negative] : Endocrine 14

## 2020-09-30 NOTE — OB PROVIDER H&P - NS_OBGYNHISTORY_OBGYN_ALL_OB_FT
OB:   P1: FT , uncomplicated, Female, 7-13  P2: FT , Female, 8-9, complicated with postpartum hemorrhage requiring blood transfusion  P3: FT , uncomplicated, Female  P4: FT C/S for non reassuring fetal heart rate, 5-13  P5: FT , uncomplicated 9-0  P6: FT , uncomplicated 7-1    GYN: remote h/o chlamydia infection, remote h/o ruptured ovarian cyst  denies fibroids or abnormal paps

## 2020-09-30 NOTE — OB PROVIDER H&P - NSDOBORLOSS3_OBGYN_ALL_OB_DT
Assessment/Plan


Assessment/Plan


Pulmonary Progress Note





Assessment/Plan


Problems:  


(1) Healthcare associated bacterial pneumonia, RRT overnight


(2) UTI (urinary tract infection)


(3) Sepsis


(4) Dehydration


(5) CHANCE (acute kidney injury)


(6) Acute metabolic encephalopathy


(7) Hyperglycemia due to type 2 diabetes mellitus


(8) Renal failure (ARF), acute on chronic


(9) Aspiration pneumonia


(10) Abnormal TSH


(11) Pelvic mass in female


912) Congestive Heart Failure


Assessment/Plan


Leukocytosis


Sepsis


Possible pneumonia


E coli UTI


Acute hypoxemic respiratory failure


Acute encephalopathy


Hx CHF - diurese PRN


HTN


CHANCE 


Pelvic mass/possible GYN malignancy vs cyst





Plan:


-concern for pelvic malignancy noted on CT AP


-Monitor WCt


-Abx per ID


-monitor volumes and renal function, F/U renal recs


-GYN evaluation


-monitor encephalopathy, ? neuro eval


-Asp precautions, SLP recs 


-DVT Px: LMWH


-FC


- Diurese PRN


-BiPAP PRN


- HHN


- O2 PRN





ICU care





Subjective


Allergies:  


Coded Allergies:  


     No Known Allergies (Unverified , 10/14/19)


Subjective


WCT and Cr both better


CT noted with concern for gynecologic malignancy vs cyst


Improved Pulmonary status this morning





Objective





Vital Signs Noted





General Appearance:  no acute distress, cachetic


HEENT:  normocephalic, atraumatic


Respiratory/Chest:  occasional rhonchi


Cardiovascular:  normal peripheral pulses, normal rate, regular rhythm


Abdomen:  normal bowel sounds, soft, non tender, no organomegaly, non distended

, no mass


Extremities:  no cyanosis, no clubbing, no edema





Laboratory Tests Noted





CXR noted - congestion





Subjective


ROS Limited/Unobtainable:  No


Allergies:  


Coded Allergies:  


     No Known Allergies (Unverified , 10/14/19)





Objective





Last 24 Hour Vital Signs








  Date Time  Temp Pulse Resp B/P (MAP) Pulse Ox O2 Delivery O2 Flow Rate FiO2


 


10/24/19 10:00  81 18 150/58 (88) 100   


 


10/24/19 09:00  71 16 124/49 (74) 100   


 


10/24/19 08:00      Nasal Cannula 2.0 


 


10/24/19 08:00  72      


 


10/24/19 08:00 97.5 68 20 121/43 (69) 99   


 


10/24/19 07:00  74 19 124/49 (74) 99   


 


10/24/19 06:00  76 18 117/55 (75) 98   


 


10/24/19 05:00  75 18 121/49 (73) 97   


 


10/24/19 04:00  86      


 


10/24/19 04:00      Nasal Cannula 2.0 


 


10/24/19 04:00  89 24 133/42 (72) 100   


 


10/24/19 03:51  92      


 


10/24/19 03:30 98.5 106 33 136/56 (82) 100   


 


10/24/19 03:15  106 33 139/71 (93) 98   


 


10/24/19 03:03  120      


 


10/24/19 00:35 97.9 90 22 142/79 (100) 97   


 


10/24/19 00:34  93      


 


10/23/19 20:39      Nasal Cannula 2.0 


 


10/23/19 20:39 99.2 91 20 163/79 (107) 95   


 


10/23/19 20:00  93      


 


10/23/19 19:58     98 Nasal Cannula 2.0 28


 


10/23/19 16:00 98.1 64 22 150/72 (98) 98   


 


10/23/19 16:00  93      


 


10/23/19 12:00  89      


 


10/23/19 12:00 97.9 90 22 140/70 (93) 98   

















Intake and Output  


 


 10/23/19 10/24/19





 19:00 07:00


 


Intake Total  1215 ml


 


Output Total  2300 ml


 


Balance  -1085 ml


 


  


 


Intake Oral  800 ml


 


IV Total  415 ml


 


Output Urine Total  2300 ml


 


# Bowel Movements 1 4








Laboratory Tests


10/24/19 02:36: 


Arterial Blood pH 7.317L, Arterial Blood Partial Pressure CO2 39.1, Arterial 

Blood Partial Pressure O2 77.0, Arterial Blood HCO3 19.6L, Arterial Blood 

Oxygen Saturation 93.7L, Arterial Blood Base Excess -6.1L, Graham Test 


10/24/19 03:55: 


White Blood Count 24.0*H, Red Blood Count 2.94L, Hemoglobin 8.2L, Hematocrit 

24.4L, Mean Corpuscular Volume 83, Mean Corpuscular Hemoglobin 27.9, Mean 

Corpuscular Hemoglobin Concent 33.5, Red Cell Distribution Width 13.4, Platelet 

Count 421, Mean Platelet Volume 7.0, Neutrophils (%) (Auto) , Lymphocytes (%) (

Auto) , Monocytes (%) (Auto) , Eosinophils (%) (Auto) , Basophils (%) (Auto) , 

Differential Total Cells Counted 100, Neutrophils % (Manual) 89H, Lymphocytes % 

(Manual) 6L, Monocytes % (Manual) 4, Eosinophils % (Manual) 1, Basophils % (

Manual) 0, Band Neutrophils 0, Platelet Estimate Adequate, Platelet Morphology 

Normal, Hypochromasia 1+, Sodium Level 140, Potassium Level 3.6, Chloride Level 

106, Carbon Dioxide Level 22, Anion Gap 12, Blood Urea Nitrogen 22H, Creatinine 

1.6H, Estimat Glomerular Filtration Rate 32.3, Glucose Level 124H, Uric Acid 6.8

, Calcium Level 8.6, Phosphorus Level 4.4, Magnesium Level 1.7L, Total 

Bilirubin 0.4, Aspartate Amino Transf (AST/SGOT) 17, Alanine Aminotransferase (

ALT/SGPT) 18, Alkaline Phosphatase 87, C-Reactive Protein, Quantitative 8.6H, 

Pro-B-Type Natriuretic Peptide 32630Z, Total Protein 6.0L, Albumin 1.6L, 

Globulin 4.4, Albumin/Globulin Ratio 0.4L





Current Medications








 Medications


  (Trade)  Dose


 Ordered  Sig/Winnie


 Route


 PRN Reason  Start Time


 Stop Time Status Last Admin


Dose Admin


 


 Acetaminophen


  (Tylenol)  650 mg  PRN  PRN


 RECTAL


 TEMP>100.5  10/25/19 03:00


     


 


 


 Albuterol/


 Ipratropium


  (Albuterol/


 Ipratropium)  3 ml  Q4HRT  PRN


 HHN


 SHORTNESS OF BREATH  10/24/19 07:00


 10/29/19 06:59   


 


 


 Aspirin


  (ASA)  81 mg  DAILY


 ORAL


   10/24/19 09:00


 11/14/19 08:59  10/24/19 08:44


 


 


 Atorvastatin


 Calcium


  (Lipitor)  10 mg  BEDTIME


 ORAL


   10/24/19 21:00


 11/13/19 20:59   


 


 


 Barium Sulfate


  (Varibar Honey)  250 ml  NOW  PRN


 MC


 RAD  10/24/19 12:00


 10/26/19 11:54   


 


 


 Barium Sulfate


  (Varibar Nectar)  240 ml  NOW  PRN


 MC


 RAD  10/24/19 12:00


 10/26/19 11:54   


 


 


 Barium Sulfate


  (Varibar Pudding)  230 ml  NOW  PRN


 MC


 RAD  10/24/19 12:00


 10/26/19 11:54   


 


 


 Clopidogrel


 Bisulfate


  (Plavix)  75 mg  DAILY


 ORAL


   10/24/19 09:00


 11/14/19 08:59  10/24/19 08:45


 


 


 Dextrose


  (Dextrose 50%)  25 ml  Q30M  PRN


 IV


 Hypoglycemia  10/24/19 04:00


 11/13/19 06:59   


 


 


 Dextrose


  (Dextrose 50%)  50 ml  Q30M  PRN


 IV


 Hypoglycemia  10/24/19 04:00


 11/13/19 06:59   


 


 


 Dextrose/Sodium


 Chloride  1,000 ml @ 


 60 mls/hr  [id number]


 IV


   10/24/19 03:45


 11/22/19 10:24   


 


 


 Divalproex Sodium


  (Depakote


 Sprinkles)  500 mg  Q12HR


 ORAL


   10/24/19 09:00


 11/14/19 21:59  10/24/19 08:45


 


 


 Docusate Sodium


  (Colace)  100 mg  THREE TIMES A  DAY


 ORAL


   10/24/19 09:00


 11/13/19 12:59  10/24/19 08:44


 


 


 Donepezil HCl


  (Aricept)  5 mg  DAILY


 ORAL


   10/24/19 09:00


 11/14/19 10:59  10/24/19 08:44


 


 


 Enoxaparin Sodium


  (Lovenox)  30 mg  DAILY


 SUBQ


   10/24/19 09:00


 11/13/19 08:59  10/24/19 08:46


 


 


 Furosemide


  (Lasix)  20 mg  BID


 IV


   10/24/19 09:00


 11/23/19 08:59  10/24/19 08:45


 


 


 Insulin Aspart


  (NovoLOG)    BEFORE MEALS AND  HS


 SUBQ


   10/24/19 06:30


 11/13/19 11:29   


 


 


 Insulin Detemir


  (Levemir)  12 units  DAILY


 SUBQ


   10/24/19 09:00


 11/13/19 10:29  10/24/19 09:09


 


 


 Lactulose


  (Cephulac)  20 gm  TIDPRN  PRN


 ORAL


 Constipation  10/24/19 04:00


 11/15/19 03:59   


 


 


 Lorazepam


  (Ativan 2mg/ml


 1ml)  1 mg  Q8H  PRN


 IV


 For Anxiety  10/24/19 04:00


 10/31/19 03:59   


 


 


 Meropenem 1 gm/


 Sodium Chloride  55 ml @ 


 110 mls/hr  Q12H


 IVPB


   10/24/19 14:00


 10/30/19 23:59   


 


 


 Pantoprazole


  (Protonix)  40 mg  BID


 ORAL


   10/24/19 03:45


 11/13/19 08:59  10/24/19 08:51


 

















Delmer Main MD Oct 24, 2019 11:11 24-Feb-2003

## 2020-09-30 NOTE — CHART NOTE - NSCHARTNOTEFT_GEN_A_CORE
PGY4 Note:     Pt seen and evaluated at bedside. Resting comfortably, denies headache, dizziness/lightheadedness/CP/SOB/palpitations.     Vital Signs Last 24 Hrs  T(C): 37.1 (30 Sep 2020 20:12), Max: 37.1 (30 Sep 2020 20:12)  T(F): 98.8 (30 Sep 2020 20:12), Max: 98.8 (30 Sep 2020 20:12)  HR: 38 (30 Sep 2020 22:03) (38 - 139) false reading, on manual palpation: 98 BPM   BP: 103/61 (30 Sep 2020 22:08) (93/58 - 128/61)  RR: 18 (30 Sep 2020 20:57) (18 - 18)  SpO2: 99% (30 Sep 2020 22:07) (89% - 100%)     Abd: soft, appropriately tender, nondistended; no r/g/r; fundus firm   Pelvic: moderate lochia; no clots expressed   Peripheral pulses: present B/L on palpation; right groin dressing c/d/i     Plan:   - f/u 2200 labs+coags   - monitor vitals/bleeding     Will inform Dr. Chapa

## 2020-09-30 NOTE — OB RN DELIVERY SUMMARY - NSDELAYEDCLAMPA_OBGYN_ALL_OB
IVC filter placement, mild ectasia of aorta 29mm.  Repeat ultrasound for aorta surveillance, and IVC filter.  Follow up in one year.      Recommend blood pressure management of <120 systolic.  Contact office with any concerns.     Yes

## 2020-09-30 NOTE — PRE-ANESTHESIA EVALUATION ADULT - NSATTENDATTESTRD_GEN_ALL_CORE
The patient has been re-examined and I agree with the above assessment or I updated with my findings.
none

## 2020-09-30 NOTE — CHART NOTE - NSCHARTNOTEFT_GEN_A_CORE
PGY3 note    Patient seen and evaluated at bedside. Reports having moderately painful uterine cramps and feels gushes of vaginal bleeding.     Vital Signs Last 24 Hrs  T(C): 36.7 (30 Sep 2020 13:37), Max: 36.8 (30 Sep 2020 10:02)  T(F): 98 (30 Sep 2020 13:37), Max: 98.2 (30 Sep 2020 10:02)  HR: 137 (30 Sep 2020 18:15) (59 - 139)  BP: 128/61 (30 Sep 2020 18:07) (93/58 - 128/61)  RR: 18 (30 Sep 2020 10:10) (18 - 18)  SpO2: 99% (30 Sep 2020 18:15) (89% - 100%)    On exam:  General: appears in no acute distress. Awake, alert and oriented  Speaking in full sentences.  Resp: CTA b/l  Cardio: RRR, normal S1,S2  Abdomen: mild distention, soft, non tender, fundus palpated 1 fingerbreadth above the umbilicus, incision covered with pressure dressing, clean/dry/intact  Bimanual exam performed.   a total of 1100cc of clots were evacuated upon 2 attempts.  Clots palpated in the cervical canal.    Labs:                        10.4   15.57 )-----------( 247      ( 30 Sep 2020 17:45 )             31.8                         11.6   9.13  )-----------( 198      ( 30 Sep 2020 14:30 )             36.4                         10.6   7.96  )-----------( 229      ( 30 Sep 2020 10:29 )             32.8         Plan: s/p repeat  section with placenta previa, EBL 1800cc intraop, s/p 2U of pRBCs intraoperatively, clinically and hemodynamically stable, with continued vaginal bleeding.    Plan:   1U of pRBC  1U of FFP  Cross for 2U of pRBCs  Pain management  IV hydration  Interventional radiology consult for possible UA embolization    Dr. Chapa aware

## 2020-09-30 NOTE — OB RN PATIENT PROFILE - NS PRO PT RIGHT SUPPORT PERSON
Post-Care Instructions: I reviewed with the patient in detail post-care instructions. Patient is to wear sunprotection, and avoid picking at any of the treated lesions. Pt may apply Vaseline to crusted or scabbing areas. Consent: The patient's consent was obtained including but not limited to risks of crusting, scabbing, blistering, scarring, darker or lighter pigmentary change, recurrence, incomplete removal and infection. Duration Of Freeze Thaw-Cycle (Seconds): 3 Render Post-Care Instructions In Note?: no Detail Level: Zone Declines

## 2020-09-30 NOTE — OB PROVIDER H&P - HISTORY OF PRESENT ILLNESS
34 y/o  at 38w0d, ESTHELA 10/14/20, dated by LMP c/w first trimester sonogram, presents for scheduled c/s for marginal previa. Pregnancy complicated with anemia. Patient tested positive for THC in pregnancy, last time she smoked marijuana was 3 months. Patient had an elevated GCT but normal GTT. She has a h/o of blood transfusion with P3 for postpartum hemorrhage for retained placenta. H/o c/s x1 for non reassuring fetal heart rate. Denies any other complications, LOF, vaginal bleeding, contractions, urinary symptoms. Reports good fetal movement. GBS positive    Patient signed BTL, but does not desire it at this time.

## 2020-09-30 NOTE — BRIEF OPERATIVE NOTE - NSICDXBRIEFPREOP_GEN_ALL_CORE_FT
PRE-OP DIAGNOSIS:  Term pregnancy 30-Sep-2020 14:41:03  Carmelina Garcia  History of  section 30-Sep-2020 14:41:44  Carmelina Garcia  Placenta previa 30-Sep-2020 14:42:41  Carmelina Garcia

## 2020-09-30 NOTE — PRE-ANESTHESIA EVALUATION ADULT - NSANTHOSAYNRD_GEN_A_CORE
No. AVELINO screening performed.  STOP BANG Legend: 0-2 = LOW Risk; 3-4 = INTERMEDIATE Risk; 5-8 = HIGH Risk

## 2020-09-30 NOTE — OB PROVIDER DELIVERY SUMMARY - NS_DELIVERYASSIST1_OBGYN_ALL_OB_FT
[facility] Sarecycline Counseling: Patient advised regarding possible photosensitivity and discoloration of the teeth, skin, lips, tongue and gums.  Patient instructed to avoid sunlight, if possible.  When exposed to sunlight, patients should wear protective clothing, sunglasses, and sunscreen.  The patient was instructed to call the office immediately if the following severe adverse effects occur:  hearing changes, easy bruising/bleeding, severe headache, or vision changes.  The patient verbalized understanding of the proper use and possible adverse effects of sarecycline.  All of the patient's questions and concerns were addressed.

## 2020-09-30 NOTE — BRIEF OPERATIVE NOTE - OPERATION/FINDINGS
Clear amniotic fluid noted. Live male infant delivered in cephalic presentation, weighing 3315g, APGARS 9,9. Placenta delivered intact with membranes. Methergine IM given x1, hemabate x1 given in uterine myometrium due to atony, uterine tone improved. 1u PRBC given. Immediately post op, 900cc evacuated from the vagina, warm packing placed (2 lap sponges), cytotec 1000mcg rectal given. Vitals remained stabled, second unit prbc given.

## 2020-10-01 LAB
APTT BLD: 29.2 SEC — SIGNIFICANT CHANGE UP (ref 27–39.2)
APTT BLD: 30.7 SEC — SIGNIFICANT CHANGE UP (ref 27–39.2)
APTT BLD: 30.7 SEC — SIGNIFICANT CHANGE UP (ref 27–39.2)
BASOPHILS # BLD AUTO: 0.03 K/UL — SIGNIFICANT CHANGE UP (ref 0–0.2)
BASOPHILS # BLD AUTO: 0.03 K/UL — SIGNIFICANT CHANGE UP (ref 0–0.2)
BASOPHILS NFR BLD AUTO: 0.2 % — SIGNIFICANT CHANGE UP (ref 0–1)
BASOPHILS NFR BLD AUTO: 0.2 % — SIGNIFICANT CHANGE UP (ref 0–1)
EOSINOPHIL # BLD AUTO: 0.01 K/UL — SIGNIFICANT CHANGE UP (ref 0–0.7)
EOSINOPHIL # BLD AUTO: 0.02 K/UL — SIGNIFICANT CHANGE UP (ref 0–0.7)
EOSINOPHIL NFR BLD AUTO: 0.1 % — SIGNIFICANT CHANGE UP (ref 0–8)
EOSINOPHIL NFR BLD AUTO: 0.1 % — SIGNIFICANT CHANGE UP (ref 0–8)
FIBRINOGEN PPP-MCNC: 468 MG/DL — SIGNIFICANT CHANGE UP (ref 204.4–570.6)
FIBRINOGEN PPP-MCNC: 573 MG/DL — HIGH (ref 204.4–570.6)
FIBRINOGEN PPP-MCNC: 576 MG/DL — HIGH (ref 204.4–570.6)
HCT VFR BLD CALC: 23.6 % — LOW (ref 37–47)
HCT VFR BLD CALC: 26.3 % — LOW (ref 37–47)
HCT VFR BLD CALC: 26.4 % — LOW (ref 37–47)
HGB BLD-MCNC: 7.7 G/DL — LOW (ref 12–16)
HGB BLD-MCNC: 8.6 G/DL — LOW (ref 12–16)
HGB BLD-MCNC: 8.8 G/DL — LOW (ref 12–16)
IMM GRANULOCYTES NFR BLD AUTO: 0.5 % — HIGH (ref 0.1–0.3)
IMM GRANULOCYTES NFR BLD AUTO: 0.6 % — HIGH (ref 0.1–0.3)
INR BLD: 1.01 RATIO — SIGNIFICANT CHANGE UP (ref 0.65–1.3)
INR BLD: 1.02 RATIO — SIGNIFICANT CHANGE UP (ref 0.65–1.3)
INR BLD: 1.04 RATIO — SIGNIFICANT CHANGE UP (ref 0.65–1.3)
LYMPHOCYTES # BLD AUTO: 1.14 K/UL — LOW (ref 1.2–3.4)
LYMPHOCYTES # BLD AUTO: 1.28 K/UL — SIGNIFICANT CHANGE UP (ref 1.2–3.4)
LYMPHOCYTES # BLD AUTO: 8 % — LOW (ref 20.5–51.1)
LYMPHOCYTES # BLD AUTO: 8.2 % — LOW (ref 20.5–51.1)
MCHC RBC-ENTMCNC: 28.4 PG — SIGNIFICANT CHANGE UP (ref 27–31)
MCHC RBC-ENTMCNC: 29.6 PG — SIGNIFICANT CHANGE UP (ref 27–31)
MCHC RBC-ENTMCNC: 29.8 PG — SIGNIFICANT CHANGE UP (ref 27–31)
MCHC RBC-ENTMCNC: 32.6 G/DL — SIGNIFICANT CHANGE UP (ref 32–37)
MCHC RBC-ENTMCNC: 32.7 G/DL — SIGNIFICANT CHANGE UP (ref 32–37)
MCHC RBC-ENTMCNC: 33.3 G/DL — SIGNIFICANT CHANGE UP (ref 32–37)
MCV RBC AUTO: 87.1 FL — SIGNIFICANT CHANGE UP (ref 81–99)
MCV RBC AUTO: 89.5 FL — SIGNIFICANT CHANGE UP (ref 81–99)
MCV RBC AUTO: 90.4 FL — SIGNIFICANT CHANGE UP (ref 81–99)
MONOCYTES # BLD AUTO: 1.17 K/UL — HIGH (ref 0.1–0.6)
MONOCYTES # BLD AUTO: 1.25 K/UL — HIGH (ref 0.1–0.6)
MONOCYTES NFR BLD AUTO: 8 % — SIGNIFICANT CHANGE UP (ref 1.7–9.3)
MONOCYTES NFR BLD AUTO: 8.2 % — SIGNIFICANT CHANGE UP (ref 1.7–9.3)
NEUTROPHILS # BLD AUTO: 11.76 K/UL — HIGH (ref 1.4–6.5)
NEUTROPHILS # BLD AUTO: 12.98 K/UL — HIGH (ref 1.4–6.5)
NEUTROPHILS NFR BLD AUTO: 82.9 % — HIGH (ref 42.2–75.2)
NEUTROPHILS NFR BLD AUTO: 83 % — HIGH (ref 42.2–75.2)
NRBC # BLD: 0 /100 WBCS — SIGNIFICANT CHANGE UP (ref 0–0)
PLATELET # BLD AUTO: 151 K/UL — SIGNIFICANT CHANGE UP (ref 130–400)
PLATELET # BLD AUTO: 160 K/UL — SIGNIFICANT CHANGE UP (ref 130–400)
PLATELET # BLD AUTO: 167 K/UL — SIGNIFICANT CHANGE UP (ref 130–400)
PROTHROM AB SERPL-ACNC: 11.6 SEC — SIGNIFICANT CHANGE UP (ref 9.95–12.87)
PROTHROM AB SERPL-ACNC: 11.7 SEC — SIGNIFICANT CHANGE UP (ref 9.95–12.87)
PROTHROM AB SERPL-ACNC: 12 SEC — SIGNIFICANT CHANGE UP (ref 9.95–12.87)
RBC # BLD: 2.71 M/UL — LOW (ref 4.2–5.4)
RBC # BLD: 2.91 M/UL — LOW (ref 4.2–5.4)
RBC # BLD: 2.95 M/UL — LOW (ref 4.2–5.4)
RBC # FLD: 20 % — HIGH (ref 11.5–14.5)
RBC # FLD: 20.4 % — HIGH (ref 11.5–14.5)
RBC # FLD: 22.2 % — HIGH (ref 11.5–14.5)
SARS-COV-2 IGG SERPL QL IA: NEGATIVE — SIGNIFICANT CHANGE UP
SARS-COV-2 IGM SERPL IA-ACNC: 0.1 INDEX — SIGNIFICANT CHANGE UP
SURGICAL PATHOLOGY STUDY: SIGNIFICANT CHANGE UP
WBC # BLD: 14.19 K/UL — HIGH (ref 4.8–10.8)
WBC # BLD: 15.17 K/UL — HIGH (ref 4.8–10.8)
WBC # BLD: 15.64 K/UL — HIGH (ref 4.8–10.8)
WBC # FLD AUTO: 14.19 K/UL — HIGH (ref 4.8–10.8)
WBC # FLD AUTO: 15.17 K/UL — HIGH (ref 4.8–10.8)
WBC # FLD AUTO: 15.64 K/UL — HIGH (ref 4.8–10.8)

## 2020-10-01 RX ORDER — KETOROLAC TROMETHAMINE 30 MG/ML
15 SYRINGE (ML) INJECTION EVERY 6 HOURS
Refills: 0 | Status: DISCONTINUED | OUTPATIENT
Start: 2020-10-01 | End: 2020-10-02

## 2020-10-01 RX ORDER — SODIUM CHLORIDE 9 MG/ML
1000 INJECTION INTRAMUSCULAR; INTRAVENOUS; SUBCUTANEOUS ONCE
Refills: 0 | Status: COMPLETED | OUTPATIENT
Start: 2020-10-01 | End: 2020-10-01

## 2020-10-01 RX ORDER — SODIUM CHLORIDE 9 MG/ML
1000 INJECTION, SOLUTION INTRAVENOUS
Refills: 0 | Status: DISCONTINUED | OUTPATIENT
Start: 2020-10-01 | End: 2020-10-01

## 2020-10-01 RX ORDER — OXYCODONE HYDROCHLORIDE 5 MG/1
5 TABLET ORAL
Refills: 0 | Status: DISCONTINUED | OUTPATIENT
Start: 2020-10-01 | End: 2020-10-05

## 2020-10-01 RX ORDER — SODIUM CHLORIDE 9 MG/ML
1000 INJECTION, SOLUTION INTRAVENOUS
Refills: 0 | Status: DISCONTINUED | OUTPATIENT
Start: 2020-10-01 | End: 2020-10-03

## 2020-10-01 RX ORDER — ACETAMINOPHEN 500 MG
1000 TABLET ORAL ONCE
Refills: 0 | Status: COMPLETED | OUTPATIENT
Start: 2020-10-01 | End: 2020-10-01

## 2020-10-01 RX ADMIN — SIMETHICONE 80 MILLIGRAM(S): 80 TABLET, CHEWABLE ORAL at 03:27

## 2020-10-01 RX ADMIN — SODIUM CHLORIDE 125 MILLILITER(S): 9 INJECTION, SOLUTION INTRAVENOUS at 04:06

## 2020-10-01 RX ADMIN — SIMETHICONE 80 MILLIGRAM(S): 80 TABLET, CHEWABLE ORAL at 11:41

## 2020-10-01 RX ADMIN — Medication 1000 MILLIGRAM(S): at 08:00

## 2020-10-01 RX ADMIN — Medication 15 MILLIGRAM(S): at 23:10

## 2020-10-01 RX ADMIN — Medication 400 MILLIGRAM(S): at 17:07

## 2020-10-01 RX ADMIN — SODIUM CHLORIDE 1000 MILLILITER(S): 9 INJECTION INTRAMUSCULAR; INTRAVENOUS; SUBCUTANEOUS at 02:00

## 2020-10-01 RX ADMIN — SIMETHICONE 80 MILLIGRAM(S): 80 TABLET, CHEWABLE ORAL at 16:53

## 2020-10-01 RX ADMIN — Medication 1000 MILLIGRAM(S): at 00:59

## 2020-10-01 RX ADMIN — Medication 400 MILLIGRAM(S): at 07:49

## 2020-10-01 RX ADMIN — OXYCODONE HYDROCHLORIDE 5 MILLIGRAM(S): 5 TABLET ORAL at 14:38

## 2020-10-01 RX ADMIN — Medication 975 MILLIGRAM(S): at 23:22

## 2020-10-01 RX ADMIN — Medication 15 MILLIGRAM(S): at 22:39

## 2020-10-01 RX ADMIN — SIMETHICONE 80 MILLIGRAM(S): 80 TABLET, CHEWABLE ORAL at 23:23

## 2020-10-01 NOTE — PROGRESS NOTE ADULT - ASSESSMENT
A/P: S/P C/S POD  , recovering well   - encourage ambulation, PO hydration  - f/u AM CBC   - monitor vitals, bleeding   - advance diet as tolerated   - encourage incentive spirometry   - pain mgmt prn   - simethicone/colace   - routine postop care   - lovenox   - anticipate d/c home A/P: s/p repeat C/S for placenta previa, PPH s/p 4U pRBC, 2FFP, and multiple uterotonics (hemabate x1 intrauterine, methergine x1, cytotec 1000mcg) and s/p B/L uterine artery embolization by IR; total EBL 2900cc, POD1, currently recovering well    - tylenol/oxycodone PRN for pain management   - monitor vitals, bleeding   - check peripheral pulses q4h   - encourage incentive spirometer   - NPO/IVF   - f/u UO   - f/u repeat labs ordered 0600   - crossed for additional 2U pRBC   - simethicone   - routine postop care   - SCDs for DVT prophylaxis     Dr. Chapa aware.

## 2020-10-01 NOTE — PROGRESS NOTE ADULT - ASSESSMENT
34 yo P7 s/p repeat C/S for placenta previa, possible focal accreta, PPH s/p 4U pRBC, 2FFP, and multiple uterotonics (hemabate x1 intrauterine, methergine x1, cytotec 1000mcg) and s/p B/L uterine artery embolization by IR; total EBL 2900cc, POD1, currently recovering well  - tylenol/oxycodone PRN for pain management   - monitor vitals, bleeding   - check peripheral pulses q4h   - encourage incentive spirometer   - NPO/IVF - will advance diet pending labs in M  - f/u UO - adequate, will discontinue treadwell pending labs  - f/u repeat labs ordered 0600   - crossed for additional 2U pRBC   - simethicone   - routine postop care   - SCDs for DVT prophylaxis, lovenox held for now.   -Encourage PO hydration, ambulation, incentive spirometry    Will discuss with Dr. Chapa

## 2020-10-01 NOTE — CHART NOTE - NSCHARTNOTEFT_GEN_A_CORE
PGY3 Transfer Note    Patient is seen and evaluated at bedside. Doing well. Pain is in intermittent severe cramping pain. Denies excessive vaginal bleeding, dizziness, lightheadedness, chest pain, SOB, extremity pain or swelling.    ICU Vital Signs Last 24 Hrs  T(C): 37.1 (30 Sep 2020 20:12), Max: 37.1 (30 Sep 2020 20:12)  T(F): 98.8 (30 Sep 2020 20:12), Max: 98.8 (30 Sep 2020 20:12)  HR: 78 (01 Oct 2020 18:20) (38 - 127)  BP: 98/51 (01 Oct 2020 18:20) (86/47 - 113/60)  RR: 1 (01 Oct 2020 09:49) (1 - 20)  SpO2: 100% (01 Oct 2020 10:32) (80% - 100%)    General: not in acute distress  Resp: CTA b/l  Cardio: S1,S2 wnl  Abd: soft, mildly tender, Fundus firm below the umbilicus. Incision: Pressure dressing in place.  Extremity: SCDs in place. No edema or erythema  Perineal 30cc on chux over 2 hours                          8.8    15.17 )-----------( 160      ( 01 Oct 2020 18:15 )             26.4                         8.6    14.19 )-----------( 151      ( 01 Oct 2020 15:00 )             26.3                         7.7    15.64 )-----------( 167      ( 01 Oct 2020 06:00 )             23.6     A/P:   34 yo P7 s/p repeat C/S for placenta previa, possible focal accreta, PPH s/p 6U pRBC, 3FFP, and multiple uterotonics (hemabate x1 intrauterine, methergine x1, cytotec 1000mcg) and s/p B/L uterine artery embolization by IR; total EBL 2900cc, POD1, currently clinically and hemodynamically stable    - Transfer to 4D  - tylenol/oxycodone PRN for pain management   - monitor vitals q 4hours  - Monitor bleeding  - encourage incentive spirometry  - Regular diet  - Out of bed to chair- ambulate as tolerated  - f/u UO - adequate, will discontinue treadwell in the AM  - Repeat labs in the AM  - crossed for additional 2U pRBC   - simethicone   - routine postop care   - SCDs for DVT prophylaxis, lovenox held for now.   -Encourage PO hydration, ambulation, incentive spirometry      Dr. Chapa aware

## 2020-10-01 NOTE — PROGRESS NOTE ADULT - SUBJECTIVE AND OBJECTIVE BOX
Interventional Radiology Follow- Up Note      35y Female s/p bilateral UAE on  in Interventional Radiology with Dr Beltran     O/N:     S: No complaints offered. Feels well but has not had bowel movement yet.    Vitals: T(F): 98.8 (20 @ 20:12), Max: 98.8 (20 @ 20:12)  HR: 74 (10-01-20 @ 09:52) (38 - 139)  BP: 104/52 (10-01-20 @ 09:50) (86/47 - 128/61)  RR: 18 (10-01-20 @ 06:00) (18 - 18)  SpO2: 100% (10-01-20 @ 09:52) (80% - 100%)  Wt(kg): --    LABS:                        7.7    15.64 )-----------( 167      ( 01 Oct 2020 06:00 )             23.6         135  |  106  |  6<L>  ----------------------------<  82  4.0   |  18  |  0.5<L>    Ca    8.5      30 Sep 2020 17:45      PT/INR - ( 01 Oct 2020 06:00 )   PT: 12.00 sec;   INR: 1.04 ratio         PTT - ( 01 Oct 2020 06:00 )  PTT:30.7 sec    Impression: 35y Female admitted with         A/P:    35F s/p bilateral UAE for postpartum hemorrhage following . Patient tolerated procedure well. Right groin access site is tender but soft to palpation. Hemoglobin noted, likely secondary to equilibration after acute blood loss.         Please call Interventional Radiology [phone] with any questions, concerns, or issues regarding above.

## 2020-10-01 NOTE — PROGRESS NOTE ADULT - SUBJECTIVE AND OBJECTIVE BOX
PGY 4 Post Op c/s note    Pt seen and evaluated at bedside, POD1. Resting comfortably at this time, no complaints. Denies headaches, lightheadedness, CP, SOB, N/V/D, dysuria, vaginal discharge.  Pt is bedrest, NPO  treadwell in place, not passing gas, no BM yet  Breast/Bottlefeeding    Physical Exam  T(F): 98.8 (30 Sep 2020 20:12), Max: 98.8 (30 Sep 2020 20:12)  HR: 72 (01 Oct 2020 06:27) (38 - 139)  BP: 95/54 (01 Oct 2020 06:21) (86/47 - 128/61)  RR: 18 (01 Oct 2020 06:00) (18 - 18)  SpO2: 100% (01 Oct 2020 06:27) (80% - 100%)    20 @ 07:01  -  10-01-20 @ 06:32  --------------------------------------------------------  IN: 0 mL / OUT: 1205 mL / NET: -1205 mL    UOP [id number]: 210cc (52.5cc/h, minimum 41cc/h)      Gen: NAD  CVS: RRR, normal S1, S2  Lungs: CTAB  Abdomen: soft, non tender, non distended, +BS  -Incision: dressing in place with foam tape, clean and dry, no surrounding tenderness or erythema.   -Fundus: firm, non tender, below umbilicus  LE: no edema or tenderness, +pulses bilaterally. Femoral site dressing clean and dry.  Lochia: Minimal Rubra    Labs                        8.7    17.39 )-----------( 211      ( 30 Sep 2020 22:20 )             26.2                         10.4   15.57 )-----------( 247      ( 30 Sep 2020 17:45 )             31.8                         11.6   9.13  )-----------( 198      ( 30 Sep 2020 14:30 )             36.4                         10.6   7.96  )-----------( 229      ( 30 Sep 2020 10:29 )             32.8         Meds  acetaminophen   Tablet .. 975 milliGRAM(s) Oral <User Schedule>  dexAMETHasone  Injectable 4 milliGRAM(s) IV Push every 6 hours PRN  dextrose 5% + sodium chloride 0.45%. 1000 milliLiter(s) IV Continuous <Continuous>  diphenhydrAMINE 25 milliGRAM(s) Oral every 6 hours PRN  HYDROmorphone  Injectable 0.5 milliGRAM(s) IV Push every 10 minutes PRN  ibuprofen  Tablet. 600 milliGRAM(s) Oral every 6 hours  influenza   Vaccine 0.5 milliLiter(s) IntraMuscular once  lactated ringers. 1000 milliLiter(s) IV Continuous <Continuous>  lanolin Ointment 1 Application(s) Topical every 6 hours PRN  magnesium hydroxide Suspension 30 milliLiter(s) Oral two times a day PRN  morphine PF Spinal 0.2 milliGRAM(s) IntraThecal. once  naloxone Injectable 0.1 milliGRAM(s) IV Push every 3 minutes PRN  ondansetron Injectable 4 milliGRAM(s) IV Push every 6 hours PRN  oxyCODONE    IR 5 milliGRAM(s) Oral every 3 hours PRN  oxytocin Infusion 333.333 milliUNIT(s)/Min IV Continuous <Continuous>  simethicone 80 milliGRAM(s) Chew every 4 hours  sodium chloride 0.9%. 1000 milliLiter(s) IV Continuous <Continuous>  tranexamic acid IVPB 1000 milliGRAM(s) IV Intermittent once      A/P  35y yo G P , s/p  delivery for, POD, recovering well  -Encourage PO hydration, ambulation, incentive spirometry  -Anticipate discharge    PGY 4 Post Op c/s note    Pt seen and evaluated at bedside, POD1. Resting comfortably at this time, no complaints. Denies headaches, lightheadedness, CP, SOB, N/V/D, dysuria, vaginal discharge.  Pt is bedrest, NPO  treadwell in place, not passing gas, no BM yet  Breast/Bottlefeeding    Physical Exam  T(F): 98.8 (30 Sep 2020 20:12), Max: 98.8 (30 Sep 2020 20:12)  HR: 72 (01 Oct 2020 06:27) (38 - 139)  BP: 95/54 (01 Oct 2020 06:21) (86/47 - 128/61)  RR: 18 (01 Oct 2020 06:00) (18 - 18)  SpO2: 100% (01 Oct 2020 06:27) (80% - 100%)    09-30-20 @ 07:01  -  10-01-20 @ 06:32  --------------------------------------------------------  IN: 0 mL / OUT: 1205 mL / NET: -1205 mL    UOP [id number]: 210cc (52.5cc/h, minimum 41cc/h)      Gen: NAD  CVS: RRR, normal S1, S2  Lungs: CTAB  Abdomen: soft, non tender, non distended, +BS  -Incision: dressing in place with foam tape, clean and dry, no surrounding tenderness or erythema.   -Fundus: firm, non tender, below umbilicus  LE: no edema or tenderness, +pulses bilaterally. Femoral site dressing clean and dry.  Lochia: Minimal Rubra    Labs                        8.7    17.39 )-----------( 211      ( 30 Sep 2020 22:20 )             26.2                         10.4   15.57 )-----------( 247      ( 30 Sep 2020 17:45 )             31.8                         11.6   9.13  )-----------( 198      ( 30 Sep 2020 14:30 )             36.4                         10.6   7.96  )-----------( 229      ( 30 Sep 2020 10:29 )             32.8         Meds  acetaminophen   Tablet .. 975 milliGRAM(s) Oral <User Schedule>  dexAMETHasone  Injectable 4 milliGRAM(s) IV Push every 6 hours PRN  dextrose 5% + sodium chloride 0.45%. 1000 milliLiter(s) IV Continuous <Continuous>  diphenhydrAMINE 25 milliGRAM(s) Oral every 6 hours PRN  HYDROmorphone  Injectable 0.5 milliGRAM(s) IV Push every 10 minutes PRN  ibuprofen  Tablet. 600 milliGRAM(s) Oral every 6 hours  influenza   Vaccine 0.5 milliLiter(s) IntraMuscular once  lactated ringers. 1000 milliLiter(s) IV Continuous <Continuous>  lanolin Ointment 1 Application(s) Topical every 6 hours PRN  magnesium hydroxide Suspension 30 milliLiter(s) Oral two times a day PRN  morphine PF Spinal 0.2 milliGRAM(s) IntraThecal. once  naloxone Injectable 0.1 milliGRAM(s) IV Push every 3 minutes PRN  ondansetron Injectable 4 milliGRAM(s) IV Push every 6 hours PRN  oxyCODONE    IR 5 milliGRAM(s) Oral every 3 hours PRN  oxytocin Infusion 333.333 milliUNIT(s)/Min IV Continuous <Continuous>  simethicone 80 milliGRAM(s) Chew every 4 hours  sodium chloride 0.9%. 1000 milliLiter(s) IV Continuous <Continuous>  tranexamic acid IVPB 1000 milliGRAM(s) IV Intermittent once

## 2020-10-01 NOTE — PROGRESS NOTE ADULT - SUBJECTIVE AND OBJECTIVE BOX
JOSE A CADE  35y  Female    PGY4 Note:    Pt recovering well, no acute complaints. Pain well controlled, denies dizziness/lightheadedness/CP/SOB/palpitations. Denies fever, chills, nausea/vomiting, diarrhea, dysuria, LE pain, foul smelling vaginal discharge.     Ambulating: No  Voiding: Lynn in place  Flatus: No  Bowel movements: No   Diet: NPO    Physical Exam  Vital Signs Last 24 Hrs  T(C): 37.1 (30 Sep 2020 20:12), Max: 37.1 (30 Sep 2020 20:12)  T(F): 98.8 (30 Sep 2020 20:12), Max: 98.8 (30 Sep 2020 20:12)  HR: 76 (01 Oct 2020 02:37) (38 - 139)  BP: 90/53 (01 Oct 2020 02:37) (90/53 - 128/61)  RR: 18 (30 Sep 2020 23:00) (18 - 18)  SpO2: 100% (01 Oct 2020 02:37) (89% - 100%)    Gen: AAOx3, NAD  Heart: RRR, S1S2+  Lungs: CTA B/L, no r/r/w   Fundus: firm, below umbilicus   Wound: dressing in place- c/d/i    Abd: Soft, appropriately tender, nondistended, BS+ right groin dressing- c/d/i   Lochia: moderate; no clots evacuated   Ext: No calf tenderness, no swelling; peripheral pulses present B/L     Labs:                        8.7    17.39 )-----------( 211      ( 30 Sep 2020 22:20 )             26.2       PT/INR - ( 30 Sep 2020 22:20 )   PT: 11.90 sec;   INR: 1.03 ratio         PTT - ( 30 Sep 2020 22:20 )  PTT:29.1 sec    fib 477                       10.4   15.57 )-----------( 247      ( 30 Sep 2020 17:45 )             31.8      MEDICATIONS  (STANDING):  acetaminophen   Tablet .. 975 milliGRAM(s) Oral <User Schedule>  ibuprofen  Tablet. 600 milliGRAM(s) Oral every 6 hours  influenza   Vaccine 0.5 milliLiter(s) IntraMuscular once  lactated ringers. 1000 milliLiter(s) (125 mL/Hr) IV Continuous <Continuous>  morphine PF Spinal 0.2 milliGRAM(s) IntraThecal. once  oxytocin Infusion 333.333 milliUNIT(s)/Min (1000 mL/Hr) IV Continuous <Continuous>  simethicone 80 milliGRAM(s) Chew every 4 hours  sodium chloride 0.9% Bolus 1000 milliLiter(s) IV Bolus once  sodium chloride 0.9%. 1000 milliLiter(s) (125 mL/Hr) IV Continuous <Continuous>  tranexamic acid IVPB 1000 milliGRAM(s) IV Intermittent once    MEDICATIONS  (PRN):  dexAMETHasone  Injectable 4 milliGRAM(s) IV Push every 6 hours PRN Nausea  diphenhydrAMINE 25 milliGRAM(s) Oral every 6 hours PRN Itching  HYDROmorphone  Injectable 0.5 milliGRAM(s) IV Push every 10 minutes PRN Moderate Pain (4 - 6)  lanolin Ointment 1 Application(s) Topical every 6 hours PRN Sore Nipples  magnesium hydroxide Suspension 30 milliLiter(s) Oral two times a day PRN Constipation  naloxone Injectable 0.1 milliGRAM(s) IV Push every 3 minutes PRN For ANY of the following changes in patient status:  A. Breaths Per Minute LESS THAN 10, B. Oxygen saturation LESS THAN 90%, C. Sedation score of 6 for Stop After: 4 Times  ondansetron Injectable 4 milliGRAM(s) IV Push every 6 hours PRN Nausea  oxyCODONE    IR 5 milliGRAM(s) Oral every 3 hours PRN Moderate to Severe Pain (4-10)     JOSE A CADE  35y  Female    PGY4 Note:    Pt recovering well, no acute complaints. Pain well controlled, denies dizziness/lightheadedness/CP/SOB/palpitations. Denies fever, chills, nausea/vomiting, diarrhea, dysuria, LE pain, foul smelling vaginal discharge.     Ambulating: No  Voiding: Lynn in place  Flatus: No  Bowel movements: No   Diet: NPO    Physical Exam  Vital Signs Last 24 Hrs  T(C): 37.1 (30 Sep 2020 20:12), Max: 37.1 (30 Sep 2020 20:12)  T(F): 98.8 (30 Sep 2020 20:12), Max: 98.8 (30 Sep 2020 20:12)  HR: 76 (01 Oct 2020 02:37) (38 - 139)  BP: 90/53 (01 Oct 2020 02:37) (90/53 - 128/61)  RR: 18 (30 Sep 2020 23:00) (18 - 18)  SpO2: 100% (01 Oct 2020 02:37) (89% - 100%)    Gen: AAOx3, NAD  Heart: RRR, S1S2+  Lungs: CTA B/L, no r/r/w   Fundus: firm, below umbilicus   Wound: dressing in place- c/d/i    Abd: Soft, appropriately tender, nondistended, BS+ right groin dressing- c/d/i   Lochia: moderate; no clots evacuated   Ext: No calf tenderness, no swelling; peripheral pulses present B/L     UO (8098-8594) 375cc/8h-> 46.8cc/hr (min 41cc/hr)     Labs:                        8.7    17.39 )-----------( 211      ( 30 Sep 2020 22:20 )             26.2       PT/INR - ( 30 Sep 2020 22:20 )   PT: 11.90 sec;   INR: 1.03 ratio         PTT - ( 30 Sep 2020 22:20 )  PTT:29.1 sec    fib 477                       10.4   15.57 )-----------( 247      ( 30 Sep 2020 17:45 )             31.8      MEDICATIONS  (STANDING):  acetaminophen   Tablet .. 975 milliGRAM(s) Oral <User Schedule>  ibuprofen  Tablet. 600 milliGRAM(s) Oral every 6 hours  influenza   Vaccine 0.5 milliLiter(s) IntraMuscular once  lactated ringers. 1000 milliLiter(s) (125 mL/Hr) IV Continuous <Continuous>  morphine PF Spinal 0.2 milliGRAM(s) IntraThecal. once  oxytocin Infusion 333.333 milliUNIT(s)/Min (1000 mL/Hr) IV Continuous <Continuous>  simethicone 80 milliGRAM(s) Chew every 4 hours  sodium chloride 0.9% Bolus 1000 milliLiter(s) IV Bolus once  sodium chloride 0.9%. 1000 milliLiter(s) (125 mL/Hr) IV Continuous <Continuous>  tranexamic acid IVPB 1000 milliGRAM(s) IV Intermittent once    MEDICATIONS  (PRN):  dexAMETHasone  Injectable 4 milliGRAM(s) IV Push every 6 hours PRN Nausea  diphenhydrAMINE 25 milliGRAM(s) Oral every 6 hours PRN Itching  HYDROmorphone  Injectable 0.5 milliGRAM(s) IV Push every 10 minutes PRN Moderate Pain (4 - 6)  lanolin Ointment 1 Application(s) Topical every 6 hours PRN Sore Nipples  magnesium hydroxide Suspension 30 milliLiter(s) Oral two times a day PRN Constipation  naloxone Injectable 0.1 milliGRAM(s) IV Push every 3 minutes PRN For ANY of the following changes in patient status:  A. Breaths Per Minute LESS THAN 10, B. Oxygen saturation LESS THAN 90%, C. Sedation score of 6 for Stop After: 4 Times  ondansetron Injectable 4 milliGRAM(s) IV Push every 6 hours PRN Nausea  oxyCODONE    IR 5 milliGRAM(s) Oral every 3 hours PRN Moderate to Severe Pain (4-10)

## 2020-10-02 LAB
ALBUMIN SERPL ELPH-MCNC: 2.3 G/DL — LOW (ref 3.5–5.2)
ALP SERPL-CCNC: 68 U/L — SIGNIFICANT CHANGE UP (ref 30–115)
ALT FLD-CCNC: 13 U/L — SIGNIFICANT CHANGE UP (ref 0–41)
ANION GAP SERPL CALC-SCNC: 7 MMOL/L — SIGNIFICANT CHANGE UP (ref 7–14)
APTT BLD: 29.8 SEC — SIGNIFICANT CHANGE UP (ref 27–39.2)
AST SERPL-CCNC: 24 U/L — SIGNIFICANT CHANGE UP (ref 0–41)
BASOPHILS # BLD AUTO: 0.03 K/UL — SIGNIFICANT CHANGE UP (ref 0–0.2)
BASOPHILS NFR BLD AUTO: 0.2 % — SIGNIFICANT CHANGE UP (ref 0–1)
BILIRUB SERPL-MCNC: 0.3 MG/DL — SIGNIFICANT CHANGE UP (ref 0.2–1.2)
BLD GP AB SCN SERPL QL: SIGNIFICANT CHANGE UP
BUN SERPL-MCNC: 5 MG/DL — LOW (ref 10–20)
CALCIUM SERPL-MCNC: 8.1 MG/DL — LOW (ref 8.5–10.1)
CHLORIDE SERPL-SCNC: 108 MMOL/L — SIGNIFICANT CHANGE UP (ref 98–110)
CO2 SERPL-SCNC: 24 MMOL/L — SIGNIFICANT CHANGE UP (ref 17–32)
CREAT SERPL-MCNC: 0.5 MG/DL — LOW (ref 0.7–1.5)
EOSINOPHIL # BLD AUTO: 0.02 K/UL — SIGNIFICANT CHANGE UP (ref 0–0.7)
EOSINOPHIL NFR BLD AUTO: 0.2 % — SIGNIFICANT CHANGE UP (ref 0–8)
FIBRINOGEN PPP-MCNC: 648 MG/DL — HIGH (ref 204.4–570.6)
GLUCOSE SERPL-MCNC: 91 MG/DL — SIGNIFICANT CHANGE UP (ref 70–99)
HCT VFR BLD CALC: 24.6 % — LOW (ref 37–47)
HGB BLD-MCNC: 8.2 G/DL — LOW (ref 12–16)
IMM GRANULOCYTES NFR BLD AUTO: 0.6 % — HIGH (ref 0.1–0.3)
INR BLD: 0.98 RATIO — SIGNIFICANT CHANGE UP (ref 0.65–1.3)
LYMPHOCYTES # BLD AUTO: 1.28 K/UL — SIGNIFICANT CHANGE UP (ref 1.2–3.4)
LYMPHOCYTES # BLD AUTO: 9.9 % — LOW (ref 20.5–51.1)
MCHC RBC-ENTMCNC: 29.9 PG — SIGNIFICANT CHANGE UP (ref 27–31)
MCHC RBC-ENTMCNC: 33.3 G/DL — SIGNIFICANT CHANGE UP (ref 32–37)
MCV RBC AUTO: 89.8 FL — SIGNIFICANT CHANGE UP (ref 81–99)
MONOCYTES # BLD AUTO: 1.13 K/UL — HIGH (ref 0.1–0.6)
MONOCYTES NFR BLD AUTO: 8.8 % — SIGNIFICANT CHANGE UP (ref 1.7–9.3)
NEUTROPHILS # BLD AUTO: 10.33 K/UL — HIGH (ref 1.4–6.5)
NEUTROPHILS NFR BLD AUTO: 80.3 % — HIGH (ref 42.2–75.2)
NRBC # BLD: 0 /100 WBCS — SIGNIFICANT CHANGE UP (ref 0–0)
PLATELET # BLD AUTO: 161 K/UL — SIGNIFICANT CHANGE UP (ref 130–400)
POTASSIUM SERPL-MCNC: 3.5 MMOL/L — SIGNIFICANT CHANGE UP (ref 3.5–5)
POTASSIUM SERPL-SCNC: 3.5 MMOL/L — SIGNIFICANT CHANGE UP (ref 3.5–5)
PROT SERPL-MCNC: 4.1 G/DL — LOW (ref 6–8)
PROTHROM AB SERPL-ACNC: 11.3 SEC — SIGNIFICANT CHANGE UP (ref 9.95–12.87)
RBC # BLD: 2.74 M/UL — LOW (ref 4.2–5.4)
RBC # FLD: 20.3 % — HIGH (ref 11.5–14.5)
SODIUM SERPL-SCNC: 139 MMOL/L — SIGNIFICANT CHANGE UP (ref 135–146)
WBC # BLD: 12.87 K/UL — HIGH (ref 4.8–10.8)
WBC # FLD AUTO: 12.87 K/UL — HIGH (ref 4.8–10.8)

## 2020-10-02 RX ORDER — IBUPROFEN 200 MG
600 TABLET ORAL EVERY 6 HOURS
Refills: 0 | Status: DISCONTINUED | OUTPATIENT
Start: 2020-10-02 | End: 2020-10-05

## 2020-10-02 RX ORDER — GENTAMICIN SULFATE 40 MG/ML
80 VIAL (ML) INJECTION EVERY 8 HOURS
Refills: 0 | Status: DISCONTINUED | OUTPATIENT
Start: 2020-10-02 | End: 2020-10-03

## 2020-10-02 RX ORDER — FOLIC ACID 0.8 MG
1 TABLET ORAL DAILY
Refills: 0 | Status: DISCONTINUED | OUTPATIENT
Start: 2020-10-02 | End: 2020-10-05

## 2020-10-02 RX ORDER — IRON SUCROSE 20 MG/ML
100 INJECTION, SOLUTION INTRAVENOUS ONCE
Refills: 0 | Status: COMPLETED | OUTPATIENT
Start: 2020-10-02 | End: 2020-10-02

## 2020-10-02 RX ORDER — ASCORBIC ACID 60 MG
500 TABLET,CHEWABLE ORAL DAILY
Refills: 0 | Status: DISCONTINUED | OUTPATIENT
Start: 2020-10-02 | End: 2020-10-05

## 2020-10-02 RX ORDER — ZINC SULFATE TAB 220 MG (50 MG ZINC EQUIVALENT) 220 (50 ZN) MG
220 TAB ORAL DAILY
Refills: 0 | Status: DISCONTINUED | OUTPATIENT
Start: 2020-10-02 | End: 2020-10-05

## 2020-10-02 RX ORDER — CHOLECALCIFEROL (VITAMIN D3) 125 MCG
2000 CAPSULE ORAL DAILY
Refills: 0 | Status: DISCONTINUED | OUTPATIENT
Start: 2020-10-02 | End: 2020-10-05

## 2020-10-02 RX ADMIN — Medication 15 MILLIGRAM(S): at 12:43

## 2020-10-02 RX ADMIN — Medication 1 TABLET(S): at 12:44

## 2020-10-02 RX ADMIN — Medication 15 MILLIGRAM(S): at 12:38

## 2020-10-02 RX ADMIN — Medication 100 MILLIGRAM(S): at 14:34

## 2020-10-02 RX ADMIN — Medication 975 MILLIGRAM(S): at 09:31

## 2020-10-02 RX ADMIN — SIMETHICONE 80 MILLIGRAM(S): 80 TABLET, CHEWABLE ORAL at 04:04

## 2020-10-02 RX ADMIN — IRON SUCROSE 210 MILLIGRAM(S): 20 INJECTION, SOLUTION INTRAVENOUS at 18:41

## 2020-10-02 RX ADMIN — Medication 500 MILLIGRAM(S): at 18:21

## 2020-10-02 RX ADMIN — SIMETHICONE 80 MILLIGRAM(S): 80 TABLET, CHEWABLE ORAL at 14:39

## 2020-10-02 RX ADMIN — Medication 975 MILLIGRAM(S): at 00:00

## 2020-10-02 RX ADMIN — Medication 104 MILLIGRAM(S): at 04:03

## 2020-10-02 RX ADMIN — SIMETHICONE 80 MILLIGRAM(S): 80 TABLET, CHEWABLE ORAL at 18:23

## 2020-10-02 RX ADMIN — Medication 100 MILLIGRAM(S): at 22:18

## 2020-10-02 RX ADMIN — Medication 975 MILLIGRAM(S): at 22:18

## 2020-10-02 RX ADMIN — Medication 975 MILLIGRAM(S): at 09:32

## 2020-10-02 RX ADMIN — Medication 100 MILLIGRAM(S): at 04:04

## 2020-10-02 RX ADMIN — Medication 15 MILLIGRAM(S): at 04:05

## 2020-10-02 RX ADMIN — Medication 975 MILLIGRAM(S): at 04:06

## 2020-10-02 RX ADMIN — ZINC SULFATE TAB 220 MG (50 MG ZINC EQUIVALENT) 220 MILLIGRAM(S): 220 (50 ZN) TAB at 18:23

## 2020-10-02 RX ADMIN — Medication 975 MILLIGRAM(S): at 14:36

## 2020-10-02 RX ADMIN — Medication 204 MILLIGRAM(S): at 23:12

## 2020-10-02 RX ADMIN — Medication 15 MILLIGRAM(S): at 04:30

## 2020-10-02 RX ADMIN — Medication 104 MILLIGRAM(S): at 14:36

## 2020-10-02 RX ADMIN — Medication 600 MILLIGRAM(S): at 18:15

## 2020-10-02 RX ADMIN — Medication 975 MILLIGRAM(S): at 04:35

## 2020-10-02 RX ADMIN — Medication 2000 UNIT(S): at 18:32

## 2020-10-02 RX ADMIN — Medication 600 MILLIGRAM(S): at 18:21

## 2020-10-02 RX ADMIN — Medication 1 MILLIGRAM(S): at 18:21

## 2020-10-02 NOTE — PROGRESS NOTE ADULT - SUBJECTIVE AND OBJECTIVE BOX
PGY 1 Post Op c/s note    Pt seen and evaluated at bedside, POD , recovering well, no complaints.   Pt is ambulating  Tolerating PO --- diet  Voiding/treadwell, passing gas, +BM  Breast/Bottlefeeding    Physical Exam  Vital Signs Last 24 Hrs  T(C): 36.9 (02 Oct 2020 03:36), Max: 38.1 (01 Oct 2020 22:35)  T(F): 98.5 (02 Oct 2020 03:36), Max: 100.6 (01 Oct 2020 22:35)  HR: 86 (02 Oct 2020 03:36) (61 - 108)  BP: 95/48 (02 Oct 2020 03:36) (90/55 - 115/68)  RR: 18 (02 Oct 2020 03:36) (1 - 20)  SpO2: 100% (01 Oct 2020 10:32) (88% - 100%)    20 @ 07:01  -  10-01-20 @ 07:00  --------------------------------------------------------  IN: 0 mL / OUT: 1255 mL / NET: -1255 mL    10-01-20 @ 07:01  -  10-02-20 @ 06:19  --------------------------------------------------------  IN: 0 mL / OUT: 2795 mL / NET: -2795 mL      Gen: NAD  CVS: RRR, normal S1, S2  Lungs: CTAB  Abdomen: soft, non tender, non distended, +BS  -Incision:  -Fundus:  LE: no edema or tenderness  Lochia: Minimal Rubra    Labs                        8.8    15.17 )-----------( 160      ( 01 Oct 2020 18:15 )             26.4                         8.6    14.19 )-----------( 151      ( 01 Oct 2020 15:00 )             26.3                         7.7    15.64 )-----------( 167      ( 01 Oct 2020 06:00 )             23.6                         8.7    17.39 )-----------( 211      ( 30 Sep 2020 22:20 )             26.2                         10.4   15.57 )-----------( 247      ( 30 Sep 2020 17:45 )             31.8                         11.6   9.13  )-----------( 198      ( 30 Sep 2020 14:30 )             36.4                         10.6   7.96  )-----------( 229      ( 30 Sep 2020 10:29 )             32.8         Meds  acetaminophen   Tablet .. 975 milliGRAM(s) Oral <User Schedule>  clindamycin IVPB      clindamycin IVPB 900 milliGRAM(s) IV Intermittent every 8 hours  dexAMETHasone  Injectable 4 milliGRAM(s) IV Push every 6 hours PRN  dextrose 5% + sodium chloride 0.45%. 1000 milliLiter(s) IV Continuous <Continuous>  diphenhydrAMINE 25 milliGRAM(s) Oral every 6 hours PRN  gentamicin   IVPB 80 milliGRAM(s) IV Intermittent every 8 hours  HYDROmorphone  Injectable 0.5 milliGRAM(s) IV Push every 10 minutes PRN  ibuprofen  Tablet. 600 milliGRAM(s) Oral every 6 hours  influenza   Vaccine 0.5 milliLiter(s) IntraMuscular once  ketorolac   Injectable 15 milliGRAM(s) IV Push every 6 hours  lactated ringers. 1000 milliLiter(s) IV Continuous <Continuous>  lanolin Ointment 1 Application(s) Topical every 6 hours PRN  magnesium hydroxide Suspension 30 milliLiter(s) Oral two times a day PRN  morphine PF Spinal 0.2 milliGRAM(s) IntraThecal. once  naloxone Injectable 0.1 milliGRAM(s) IV Push every 3 minutes PRN  ondansetron Injectable 4 milliGRAM(s) IV Push every 6 hours PRN  oxyCODONE    IR 5 milliGRAM(s) Oral every 3 hours PRN  oxytocin Infusion 333.333 milliUNIT(s)/Min IV Continuous <Continuous>  simethicone 80 milliGRAM(s) Chew every 4 hours  sodium chloride 0.9%. 1000 milliLiter(s) IV Continuous <Continuous>  tranexamic acid IVPB 1000 milliGRAM(s) IV Intermittent once      A/P  35y yo G P , s/p  delivery for, POD, recovering well  -Encourage PO hydration, ambulation, incentive spirometry  -Anticipate discharge    PGY 4 Post Op c/s note    Pt seen and evaluated at bedside, POD2, recovering well. Pt had fever 100.6 overnight, now afebrile. Started on antibiotics for endometritis. Otherwise no acute events overnight, no complaints this morning. Denies headache, CP, SOB, N/V/D or heavy vaginal bleeding.   Pt is not ambulating  Tolerating PO regular diet  treadwell, not passing gas, no BM yet  Breast/Bottlefeeding    Physical Exam  T(F): 98.5 (02 Oct 2020 03:36), Max: 100.6 (01 Oct 2020 22:35)  HR: 86 (02 Oct 2020 03:36) (61 - 108)  BP: 95/48 (02 Oct 2020 03:36) (90/55 - 115/68)  RR: 18 (02 Oct 2020 03:36) (1 - 20)  SpO2: 100% (01 Oct 2020 10:32) (88% - 100%)    UOP: 800cc from [id number]    Gen: NAD  CVS: RRR, normal S1, S2  Lungs: CTAB  Abdomen: soft, non tender, non distended, +BS  -Incision: dressing changed, staples in place, clean and dry, no surrounding tenderness or erythema.   -Fundus: firm, non tender, below umbilicus  LE: no edema or tenderness, SCDs in place  Lochia: Minimal Rubra    Labs                        8.8    15.17 )-----------( 160      ( 01 Oct 2020 18:15 )             26.4                         8.6    14.19 )-----------( 151      ( 01 Oct 2020 15:00 )             26.3                         7.7    15.64 )-----------( 167      ( 01 Oct 2020 06:00 )             23.6                         8.7    17.39 )-----------( 211      ( 30 Sep 2020 22:20 )             26.2                         10.4   15.57 )-----------( 247      ( 30 Sep 2020 17:45 )             31.8                         11.6   9.13  )-----------( 198      ( 30 Sep 2020 14:30 )             36.4                         10.6   7.96  )-----------( 229      ( 30 Sep 2020 10:29 )             32.8         Meds  acetaminophen   Tablet .. 975 milliGRAM(s) Oral <User Schedule>  clindamycin IVPB      clindamycin IVPB 900 milliGRAM(s) IV Intermittent every 8 hours  dexAMETHasone  Injectable 4 milliGRAM(s) IV Push every 6 hours PRN  dextrose 5% + sodium chloride 0.45%. 1000 milliLiter(s) IV Continuous <Continuous>  diphenhydrAMINE 25 milliGRAM(s) Oral every 6 hours PRN  gentamicin   IVPB 80 milliGRAM(s) IV Intermittent every 8 hours  ibuprofen  Tablet. 600 milliGRAM(s) Oral every 6 hours  influenza   Vaccine 0.5 milliLiter(s) IntraMuscular once  ketorolac   Injectable 15 milliGRAM(s) IV Push every 6 hours  lactated ringers. 1000 milliLiter(s) IV Continuous <Continuous>  lanolin Ointment 1 Application(s) Topical every 6 hours PRN  magnesium hydroxide Suspension 30 milliLiter(s) Oral two times a day PRN  morphine PF Spinal 0.2 milliGRAM(s) IntraThecal. once  naloxone Injectable 0.1 milliGRAM(s) IV Push every 3 minutes PRN  ondansetron Injectable 4 milliGRAM(s) IV Push every 6 hours PRN  oxyCODONE    IR 5 milliGRAM(s) Oral every 3 hours PRN  oxytocin Infusion 333.333 milliUNIT(s)/Min IV Continuous <Continuous>  simethicone 80 milliGRAM(s) Chew every 4 hours  sodium chloride 0.9%. 1000 milliLiter(s) IV Continuous <Continuous>  tranexamic acid IVPB 1000 milliGRAM(s) IV Intermittent once

## 2020-10-02 NOTE — MEDICAL STUDENT PROGRESS NOTE(EDUCATION) - SUBJECTIVE AND OBJECTIVE BOX
karolyn  post cesarian section 3rd year progress note    S    Ms. Goetz was seen today at bedside. She is feeling well with the only chief complaint of mild diffuse abdominal pain. She is tolerating solids and fluids PO. She has abdominal pain while attempting to ambulate. She is not passing gas. She reports mild vaginal bleeding. She felt feverish at night and said it improved with Tylenol.      O    She had a fever of 100.6 at night for which she received gentamycin, clindamycin, and Tylenol. Urine output is around 800 ccs overnight seen in treadwell.     Physical exam  Vitals (03:36 today 10-2-2020)  T 36.9 max 38.1 22:35 yesterday 10-1-2020  HR 86 (regular)  BP 95/48 (90//68)  RR 18 and unlabored    General: well appearing in no acute distress  CVS: regular rate and rythm, s1 and s2 present, no murmurs  lungs: breath sounds present bilaterally, no wheezes, rales or crackles  abdomen: distended, pain localizes to umbillicus on shallow palpation in all four quadrants, no tenderness subcostally in r and l upper quadrants, no tenderness over c section incision site, no discharge or pain with dressing removal. Distant and sparse bowel sounds present in all 4 quadrants.  gyn: 5 cc bloody discharge visible on inner thighs, probably minimal lochia rubra. pelvic exam not performed. awaiting resident    20 @ 07:01  -  10-01-20 @ 07:00  --------------------------------------------------------  IN: 0 mL / OUT: 1255 mL / NET: -1255 mL    10-01-20 @ 07:  -  10-02-20 @ 06:19  --------------------------------------------------------  IN: 0 mL / OUT: 2795 mL / NET: -2795 mL      Labs                        8.8    15.17 )-----------( 160      ( 01 Oct 2020 18:15 )             26.4                         8.6    14.19 )-----------( 151      ( 01 Oct 2020 15:00 )             26.3                         7.7    15.64 )-----------( 167      ( 01 Oct 2020 06:00 )             23.6                         8.7    17.39 )-----------( 211      ( 30 Sep 2020 22:20 )             26.2                         10.4   15.57 )-----------( 247      ( 30 Sep 2020 17:45 )             31.8                         11.6   9.13  )-----------( 198      ( 30 Sep 2020 14:30 )             36.4                         10.6   7.96  )-----------( 229      ( 30 Sep 2020 10:29 )             32.8         Meds  acetaminophen   Tablet .. 975 milliGRAM(s) Oral <User Schedule>  clindamycin IVPB      clindamycin IVPB 900 milliGRAM(s) IV Intermittent every 8 hours  dexAMETHasone  Injectable 4 milliGRAM(s) IV Push every 6 hours PRN  dextrose 5% + sodium chloride 0.45%. 1000 milliLiter(s) IV Continuous <Continuous>  diphenhydrAMINE 25 milliGRAM(s) Oral every 6 hours PRN  gentamicin   IVPB 80 milliGRAM(s) IV Intermittent every 8 hours  HYDROmorphone  Injectable 0.5 milliGRAM(s) IV Push every 10 minutes PRN  ibuprofen  Tablet. 600 milliGRAM(s) Oral every 6 hours  influenza   Vaccine 0.5 milliLiter(s) IntraMuscular once  ketorolac   Injectable 15 milliGRAM(s) IV Push every 6 hours  lactated ringers. 1000 milliLiter(s) IV Continuous <Continuous>  lanolin Ointment 1 Application(s) Topical every 6 hours PRN  magnesium hydroxide Suspension 30 milliLiter(s) Oral two times a day PRN  morphine PF Spinal 0.2 milliGRAM(s) IntraThecal. once  naloxone Injectable 0.1 milliGRAM(s) IV Push every 3 minutes PRN  ondansetron Injectable 4 milliGRAM(s) IV Push every 6 hours PRN  oxyCODONE    IR 5 milliGRAM(s) Oral every 3 hours PRN  oxytocin Infusion 333.333 milliUNIT(s)/Min IV Continuous <Continuous>  simethicone 80 milliGRAM(s) Chew every 4 hours  sodium chloride 0.9%. 1000 milliLiter(s) IV Continuous <Continuous>  tranexamic acid IVPB 1000 milliGRAM(s) IV Intermittent once    Assessment    Ms. Goetz is a 36 yo  women who delivered a 38 wk 0 d infant yesterday via c section complicated by pre-op anemia and intra-op vaginal 800cc blood loss secondary to placenta previa. s/p 6 unit transfusion of packed RBCs and 2 units FFP.  s/p uterine artery embolization performed by IR. She is recovering well with stable vital signs and minimal vaginal bleeding, with abdominal consistent with post-operative convalescence, not yet concerning for secondary disease processes.    Plan  If fever recurs, proceed with fever work-up including chest x-ray blood and urine cultures. repeat H and H to track anemia. Continue to monitor vital signs. Promote ambulation and oral fluid intake continue on PO clear diet.          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 Goetz was seen today at bedside. She is feeling well with the only chief complaint of mild abdominal pain diffusely over whole abdomen. She is tolerating solids and fluids PO. She has abdominal pain while attempting to ambulate. She is not passing gas. She reports mild vaginal bleeding. She felt feverish at night and said it improved with Tylenol.      O    She had a fever of 100.6 at night for which she received gentamycin, clindamycin, and Tylenol. Urine output is around 800 ccs overnight seen in treadwell.     Physical exam  Vitals (03:36 today 10-2-2020)  T 36.9 (max 38.1 22:35 yesterday 10-1-2020)  HR 86 (regular)  BP 95/48 (90//68)  RR 18 and unlabored    General: well appearing, in no acute distress  CVS: regular rate and rhythm, s1 and s2 present, no murmurs  lungs: breath sounds present bilaterally, no wheezes, rales or crackles  abdomen: distended, pain refers to umbilicus on shallow palpation in all four quadrants, no tenderness subcostally in r and l upper quadrants, no tenderness over c section incision site, no discharge or pain with dressing removal. Distant and sparse bowel sounds present in all 4 quadrants.  gyn: 5 cc bloody discharge visible on inner thighs, probably minimal lochia rubra. pelvic exam not performed. awaiting resident    20 @ 07:01  -  10-01-20 @ 07:00  --------------------------------------------------------  IN: 0 mL / OUT: 1255 mL / NET: -1255 mL    10-01-20 @ 07:01  -  10-02-20 @ 06:19  --------------------------------------------------------  IN: 0 mL / OUT: 2795 mL / NET: -2795 mL      Labs                        8.8    15.17 )-----------( 160      ( 01 Oct 2020 18:15 )             26.4                         8.6    14.19 )-----------( 151      ( 01 Oct 2020 15:00 )             26.3                         7.7    15.64 )-----------( 167      ( 01 Oct 2020 06:00 )             23.6                         8.7    17.39 )-----------( 211      ( 30 Sep 2020 22:20 )             26.2                         10.4   15.57 )-----------( 247      ( 30 Sep 2020 17:45 )             31.8                         11.6   9.13  )-----------( 198      ( 30 Sep 2020 14:30 )             36.4                         10.6   7.96  )-----------( 229      ( 30 Sep 2020 10:29 )             32.8         Meds  acetaminophen   Tablet .. 975 milliGRAM(s) Oral <User Schedule>  clindamycin IVPB      clindamycin IVPB 900 milliGRAM(s) IV Intermittent every 8 hours  dexAMETHasone  Injectable 4 milliGRAM(s) IV Push every 6 hours PRN  dextrose 5% + sodium chloride 0.45%. 1000 milliLiter(s) IV Continuous <Continuous>  diphenhydrAMINE 25 milliGRAM(s) Oral every 6 hours PRN  gentamicin   IVPB 80 milliGRAM(s) IV Intermittent every 8 hours  HYDROmorphone  Injectable 0.5 milliGRAM(s) IV Push every 10 minutes PRN  ibuprofen  Tablet. 600 milliGRAM(s) Oral every 6 hours  influenza   Vaccine 0.5 milliLiter(s) IntraMuscular once  ketorolac   Injectable 15 milliGRAM(s) IV Push every 6 hours  lactated ringers. 1000 milliLiter(s) IV Continuous <Continuous>  lanolin Ointment 1 Application(s) Topical every 6 hours PRN  magnesium hydroxide Suspension 30 milliLiter(s) Oral two times a day PRN  morphine PF Spinal 0.2 milliGRAM(s) IntraThecal. once  naloxone Injectable 0.1 milliGRAM(s) IV Push every 3 minutes PRN  ondansetron Injectable 4 milliGRAM(s) IV Push every 6 hours PRN  oxyCODONE    IR 5 milliGRAM(s) Oral every 3 hours PRN  oxytocin Infusion 333.333 milliUNIT(s)/Min IV Continuous <Continuous>  simethicone 80 milliGRAM(s) Chew every 4 hours  sodium chloride 0.9%. 1000 milliLiter(s) IV Continuous <Continuous>  tranexamic acid IVPB 1000 milliGRAM(s) IV Intermittent once    Assessment    Ms. Goetz is a 34 yo  women who delivered a 38 wk 0 d infant two days ago via c section complicated by pre-op anemia and intra-op vaginal 800cc blood loss secondary to placenta previa. s/p 6 unit transfusion of packed RBCs and 2 units FFP.  s/p uterine artery embolization performed by IR. She is recovering well with stable vital signs and minimal vaginal bleeding, with abdominal consistent with post-operative convalescence, not yet concerning for secondary disease processes.    Plan  If fever recurs, proceed with fever work-up including chest x-ray blood and urine cultures. repeat H and H to track anemia. Continue to monitor vital signs. Promote ambulation and oral fluid intake continue on PO clear diet.

## 2020-10-02 NOTE — PROGRESS NOTE ADULT - ASSESSMENT
36 yo P7 s/p repeat C/S for placenta previa, possible focal accreta, PPH s/p 4U pRBC, 2FFP, and multiple uterotonics (hemabate x1 intrauterine, methergine x1, cytotec 1000mcg) and s/p B/L uterine artery embolization by IR; total EBL 2900cc, POD2, temps post op, currently recovering well.   - tylenol/oxycodone PRN for pain management   - monitor vitals, bleeding. If another temp will pursue fever work up.  - encourage incentive spirometer   - Regular diet  - UOP adequate, treadwell discontinued for TOV  - f/u repeat labs ordered 0600   - crossed for additional 2U pRBC   - simethicone   - routine postop care   - SCDs for DVT prophylaxis, lovenox held for now.   -Encourage PO hydration, ambulation, incentive spirometry    Will discuss with Dr. Chapa

## 2020-10-02 NOTE — CHART NOTE - NSCHARTNOTEFT_GEN_A_CORE
Subjective:  Patient evaluated at bedside for fever of 100.6 10/1 @2236.  Denies HA, CP, SOB, breast pain, severe abdominal pain, N/V, diarrhea, constipation.  Lynn in, passing flatus.      Objective:  Vital Signs Last 24 Hrs  T(C): 37.7 (02 Oct 2020 00:34), Max: 38.1 (01 Oct 2020 22:35)  T(F): 99.8 (02 Oct 2020 00:34), Max: 100.6 (01 Oct 2020 22:35)  HR: 94 (01 Oct 2020 22:35) (61 - 108)  BP: 115/68 (01 Oct 2020 22:35) (86/47 - 115/68)  RR: 20 (01 Oct 2020 22:35) (1 - 20)  SpO2: 100% (01 Oct 2020 10:32) (80% - 100%)    PHYSICAL EXAM:  Constitutional: In no acute distress  Respiratory: CTAB, no w/r/r  Cardiovascular: RRR, no m/r/g  Breast: no erythema, induration, edema, discharge from nipples  Gastrointestinal: soft, appropriately tender, fundus firm below the umbilicus, no fundal tenderness, +BS  Pelvic: Minimal lochia  Extremities: No LE swelling, no calf tenderness    Plan:  -start gent/clinda for infection prophylaxis  -cont vitals q4h  -routine postpartum care  -f/u AM labs    Dr. Chapa and Dr. Mares aware.

## 2020-10-03 LAB
APTT BLD: 27.9 SEC — SIGNIFICANT CHANGE UP (ref 27–39.2)
BASOPHILS # BLD AUTO: 0.02 K/UL — SIGNIFICANT CHANGE UP (ref 0–0.2)
BASOPHILS NFR BLD AUTO: 0.2 % — SIGNIFICANT CHANGE UP (ref 0–1)
EOSINOPHIL # BLD AUTO: 0.04 K/UL — SIGNIFICANT CHANGE UP (ref 0–0.7)
EOSINOPHIL NFR BLD AUTO: 0.4 % — SIGNIFICANT CHANGE UP (ref 0–8)
HCT VFR BLD CALC: 25.6 % — LOW (ref 37–47)
HGB BLD-MCNC: 8.4 G/DL — LOW (ref 12–16)
IMM GRANULOCYTES NFR BLD AUTO: 1.1 % — HIGH (ref 0.1–0.3)
INR BLD: 0.97 RATIO — SIGNIFICANT CHANGE UP (ref 0.65–1.3)
LYMPHOCYTES # BLD AUTO: 1.22 K/UL — SIGNIFICANT CHANGE UP (ref 1.2–3.4)
LYMPHOCYTES # BLD AUTO: 13.3 % — LOW (ref 20.5–51.1)
MCHC RBC-ENTMCNC: 29.6 PG — SIGNIFICANT CHANGE UP (ref 27–31)
MCHC RBC-ENTMCNC: 32.8 G/DL — SIGNIFICANT CHANGE UP (ref 32–37)
MCV RBC AUTO: 90.1 FL — SIGNIFICANT CHANGE UP (ref 81–99)
MONOCYTES # BLD AUTO: 0.71 K/UL — HIGH (ref 0.1–0.6)
MONOCYTES NFR BLD AUTO: 7.7 % — SIGNIFICANT CHANGE UP (ref 1.7–9.3)
NEUTROPHILS # BLD AUTO: 7.09 K/UL — HIGH (ref 1.4–6.5)
NEUTROPHILS NFR BLD AUTO: 77.3 % — HIGH (ref 42.2–75.2)
NRBC # BLD: 0 /100 WBCS — SIGNIFICANT CHANGE UP (ref 0–0)
PLATELET # BLD AUTO: 194 K/UL — SIGNIFICANT CHANGE UP (ref 130–400)
PROTHROM AB SERPL-ACNC: 11.2 SEC — SIGNIFICANT CHANGE UP (ref 9.95–12.87)
RBC # BLD: 2.84 M/UL — LOW (ref 4.2–5.4)
RBC # FLD: 20.1 % — HIGH (ref 11.5–14.5)
WBC # BLD: 9.18 K/UL — SIGNIFICANT CHANGE UP (ref 4.8–10.8)
WBC # FLD AUTO: 9.18 K/UL — SIGNIFICANT CHANGE UP (ref 4.8–10.8)

## 2020-10-03 RX ORDER — IRON SUCROSE 20 MG/ML
100 INJECTION, SOLUTION INTRAVENOUS ONCE
Refills: 0 | Status: COMPLETED | OUTPATIENT
Start: 2020-10-03 | End: 2020-10-03

## 2020-10-03 RX ADMIN — Medication 600 MILLIGRAM(S): at 12:30

## 2020-10-03 RX ADMIN — Medication 975 MILLIGRAM(S): at 21:32

## 2020-10-03 RX ADMIN — Medication 1 TABLET(S): at 12:01

## 2020-10-03 RX ADMIN — SIMETHICONE 80 MILLIGRAM(S): 80 TABLET, CHEWABLE ORAL at 18:43

## 2020-10-03 RX ADMIN — Medication 975 MILLIGRAM(S): at 00:51

## 2020-10-03 RX ADMIN — SIMETHICONE 80 MILLIGRAM(S): 80 TABLET, CHEWABLE ORAL at 09:01

## 2020-10-03 RX ADMIN — Medication 600 MILLIGRAM(S): at 12:01

## 2020-10-03 RX ADMIN — Medication 975 MILLIGRAM(S): at 22:00

## 2020-10-03 RX ADMIN — Medication 975 MILLIGRAM(S): at 09:10

## 2020-10-03 RX ADMIN — SIMETHICONE 80 MILLIGRAM(S): 80 TABLET, CHEWABLE ORAL at 15:17

## 2020-10-03 RX ADMIN — Medication 2000 UNIT(S): at 12:01

## 2020-10-03 RX ADMIN — SIMETHICONE 80 MILLIGRAM(S): 80 TABLET, CHEWABLE ORAL at 21:33

## 2020-10-03 RX ADMIN — Medication 600 MILLIGRAM(S): at 18:56

## 2020-10-03 RX ADMIN — Medication 975 MILLIGRAM(S): at 03:18

## 2020-10-03 RX ADMIN — SIMETHICONE 80 MILLIGRAM(S): 80 TABLET, CHEWABLE ORAL at 03:18

## 2020-10-03 RX ADMIN — Medication 600 MILLIGRAM(S): at 05:20

## 2020-10-03 RX ADMIN — Medication 1 MILLIGRAM(S): at 12:01

## 2020-10-03 RX ADMIN — ZINC SULFATE TAB 220 MG (50 MG ZINC EQUIVALENT) 220 MILLIGRAM(S): 220 (50 ZN) TAB at 12:01

## 2020-10-03 RX ADMIN — SIMETHICONE 80 MILLIGRAM(S): 80 TABLET, CHEWABLE ORAL at 05:20

## 2020-10-03 RX ADMIN — Medication 600 MILLIGRAM(S): at 18:43

## 2020-10-03 RX ADMIN — Medication 975 MILLIGRAM(S): at 15:48

## 2020-10-03 RX ADMIN — Medication 975 MILLIGRAM(S): at 16:20

## 2020-10-03 RX ADMIN — Medication 600 MILLIGRAM(S): at 05:36

## 2020-10-03 RX ADMIN — Medication 500 MILLIGRAM(S): at 12:00

## 2020-10-03 RX ADMIN — Medication 975 MILLIGRAM(S): at 08:41

## 2020-10-03 RX ADMIN — IRON SUCROSE 210 MILLIGRAM(S): 20 INJECTION, SOLUTION INTRAVENOUS at 11:38

## 2020-10-03 NOTE — PROGRESS NOTE ADULT - ASSESSMENT
A/P: 36 yo P7 POD#3 s/p repeat C/S for placenta previa, possible focal accreta, PPH s/p 4U pRBC, 2FFP, and multiple uterotonics (hemabate x1 intrauterine, methergine x1, cytotec 1000mcg) and s/p B/L uterine artery embolization by IR; total EBL 2900cc,  afebrile for the last 24 hours; currently recovering well  - tylenol/oxycodone PRN for pain management   - monitor vitals, bleeding. If another temp will pursue fever work up.  - encourage incentive spirometer   - encourage ambulation   - encourage PO hydration   - diet: regular   - f/u AM labs   - crossed for additional 2U pRBC   - simethicone   - routine postop care   - DVT prophylaxis: SCDs   - Encourage PO hydration, ambulation, incentive spirometry  - anticipate discharge home today if AM H/H stable   - discussed routine postpartum precautions & care  - discussed incisional and vaginal precautions     Dr. Garcia aware. Dr. Chapa to be made aware

## 2020-10-03 NOTE — PROGRESS NOTE ADULT - SUBJECTIVE AND OBJECTIVE BOX
JOSE A CADE  35y  Female    PGY2 Note:    POD#3  Pt is recovering well, no acute complaints. Pt reports pain is well controlled on PO medications. Pt denies headache, chest pain, SOB, fever chills, nausea, vomiting, extremity pain or swelling. Pt denies headaches, vision changes, palpitations or RUQ pain. Pt denies headaches, palpitations, shortness of breath, dizziness, or syncope.     Ambulating: Yes  Voiding: Yes  Flatus: Yes  Bowel movements: No   Breast or bottle feeding: Bottlefeeding  Diet: Regular    MEDICATIONS  (STANDING):  acetaminophen   Tablet .. 975 milliGRAM(s) Oral <User Schedule>  ascorbic acid 500 milliGRAM(s) Oral daily  cholecalciferol 2000 Unit(s) Oral daily  dextrose 5% + sodium chloride 0.45%. 1000 milliLiter(s) (125 mL/Hr) IV Continuous <Continuous>  folic acid 1 milliGRAM(s) Oral daily  ibuprofen  Tablet. 600 milliGRAM(s) Oral every 6 hours  influenza   Vaccine 0.5 milliLiter(s) IntraMuscular once  lactated ringers. 1000 milliLiter(s) (125 mL/Hr) IV Continuous <Continuous>  morphine PF Spinal 0.2 milliGRAM(s) IntraThecal. once  oxytocin Infusion 333.333 milliUNIT(s)/Min (1000 mL/Hr) IV Continuous <Continuous>  prenatal multivitamin 1 Tablet(s) Oral daily  simethicone 80 milliGRAM(s) Chew every 4 hours  sodium chloride 0.9%. 1000 milliLiter(s) (125 mL/Hr) IV Continuous <Continuous>  tranexamic acid IVPB 1000 milliGRAM(s) IV Intermittent once  zinc sulfate 220 milliGRAM(s) Oral daily    MEDICATIONS  (PRN):  dexAMETHasone  Injectable 4 milliGRAM(s) IV Push every 6 hours PRN Nausea  diphenhydrAMINE 25 milliGRAM(s) Oral every 6 hours PRN Itching  HYDROmorphone  Injectable 0.5 milliGRAM(s) IV Push every 10 minutes PRN Moderate Pain (4 - 6)  lanolin Ointment 1 Application(s) Topical every 6 hours PRN Sore Nipples  magnesium hydroxide Suspension 30 milliLiter(s) Oral two times a day PRN Constipation  naloxone Injectable 0.1 milliGRAM(s) IV Push every 3 minutes PRN For ANY of the following changes in patient status:  A. Breaths Per Minute LESS THAN 10, B. Oxygen saturation LESS THAN 90%, C. Sedation score of 6 for Stop After: 4 Times  ondansetron Injectable 4 milliGRAM(s) IV Push every 6 hours PRN Nausea  oxyCODONE    IR 5 milliGRAM(s) Oral every 3 hours PRN Moderate to Severe Pain (4-10)      PAST MEDICAL & SURGICAL HISTORY:  H/O dilation and curettage     delivery delivered        Physical Exam  Vital Signs Last 24 Hrs  T(C): 36.8 (03 Oct 2020 03:30), Max: 37.6 (02 Oct 2020 19:49)  T(F): 98.2 (03 Oct 2020 03:30), Max: 99.6 (02 Oct 2020 19:49)  HR: 62 (03 Oct 2020 03:30) (61 - 91)  BP: 103/64 (03 Oct 2020 03:30) (102/64 - 129/64)  RR: 18 (03 Oct 2020 03:30) (18 - 20)    Gen: AAOx3, NAD  Heart: RRR  Lungs: CTAB  Fundus: firm, below umbilicus   Wound: staples in place; incision c/d/i   Abd: Soft, appropriately tender near incision site, mildly distended, BS+  Lochia: minimal rubra   Ext: No calf tenderness, no swelling    Labs:                        8.4    9.18  )-----------( 194      ( 03 Oct 2020 05:26 )             25.6                         8.2    12.87 )-----------( 161      ( 02 Oct 2020 05:22 )             24.6

## 2020-10-04 ENCOUNTER — TRANSCRIPTION ENCOUNTER (OUTPATIENT)
Age: 35
End: 2020-10-04

## 2020-10-04 LAB
BASOPHILS # BLD AUTO: 0.03 K/UL — SIGNIFICANT CHANGE UP (ref 0–0.2)
BASOPHILS NFR BLD AUTO: 0.4 % — SIGNIFICANT CHANGE UP (ref 0–1)
EOSINOPHIL # BLD AUTO: 0.15 K/UL — SIGNIFICANT CHANGE UP (ref 0–0.7)
EOSINOPHIL NFR BLD AUTO: 2.1 % — SIGNIFICANT CHANGE UP (ref 0–8)
HCT VFR BLD CALC: 26 % — LOW (ref 37–47)
HGB BLD-MCNC: 8.4 G/DL — LOW (ref 12–16)
IMM GRANULOCYTES NFR BLD AUTO: 1.1 % — HIGH (ref 0.1–0.3)
LYMPHOCYTES # BLD AUTO: 1.61 K/UL — SIGNIFICANT CHANGE UP (ref 1.2–3.4)
LYMPHOCYTES # BLD AUTO: 22.4 % — SIGNIFICANT CHANGE UP (ref 20.5–51.1)
MCHC RBC-ENTMCNC: 29.4 PG — SIGNIFICANT CHANGE UP (ref 27–31)
MCHC RBC-ENTMCNC: 32.3 G/DL — SIGNIFICANT CHANGE UP (ref 32–37)
MCV RBC AUTO: 90.9 FL — SIGNIFICANT CHANGE UP (ref 81–99)
MONOCYTES # BLD AUTO: 0.68 K/UL — HIGH (ref 0.1–0.6)
MONOCYTES NFR BLD AUTO: 9.5 % — HIGH (ref 1.7–9.3)
NEUTROPHILS # BLD AUTO: 4.64 K/UL — SIGNIFICANT CHANGE UP (ref 1.4–6.5)
NEUTROPHILS NFR BLD AUTO: 64.5 % — SIGNIFICANT CHANGE UP (ref 42.2–75.2)
NRBC # BLD: 0 /100 WBCS — SIGNIFICANT CHANGE UP (ref 0–0)
PLATELET # BLD AUTO: 251 K/UL — SIGNIFICANT CHANGE UP (ref 130–400)
RBC # BLD: 2.86 M/UL — LOW (ref 4.2–5.4)
RBC # FLD: 19.9 % — HIGH (ref 11.5–14.5)
WBC # BLD: 7.19 K/UL — SIGNIFICANT CHANGE UP (ref 4.8–10.8)
WBC # FLD AUTO: 7.19 K/UL — SIGNIFICANT CHANGE UP (ref 4.8–10.8)

## 2020-10-04 RX ORDER — IBUPROFEN 200 MG
1 TABLET ORAL
Qty: 28 | Refills: 0
Start: 2020-10-04 | End: 2020-10-10

## 2020-10-04 RX ORDER — FERROUS SULFATE 325(65) MG
1 TABLET ORAL
Qty: 270 | Refills: 0
Start: 2020-10-04 | End: 2021-01-01

## 2020-10-04 RX ORDER — ACETAMINOPHEN 500 MG
3 TABLET ORAL
Qty: 84 | Refills: 0
Start: 2020-10-04 | End: 2020-10-10

## 2020-10-04 RX ORDER — OXYCODONE HYDROCHLORIDE 5 MG/1
1 TABLET ORAL
Qty: 12 | Refills: 0
Start: 2020-10-04 | End: 2020-10-06

## 2020-10-04 RX ADMIN — SIMETHICONE 80 MILLIGRAM(S): 80 TABLET, CHEWABLE ORAL at 13:54

## 2020-10-04 RX ADMIN — Medication 600 MILLIGRAM(S): at 00:00

## 2020-10-04 RX ADMIN — Medication 2000 UNIT(S): at 13:00

## 2020-10-04 RX ADMIN — SIMETHICONE 80 MILLIGRAM(S): 80 TABLET, CHEWABLE ORAL at 21:46

## 2020-10-04 RX ADMIN — Medication 600 MILLIGRAM(S): at 03:51

## 2020-10-04 RX ADMIN — SIMETHICONE 80 MILLIGRAM(S): 80 TABLET, CHEWABLE ORAL at 09:56

## 2020-10-04 RX ADMIN — Medication 500 MILLIGRAM(S): at 13:00

## 2020-10-04 RX ADMIN — SIMETHICONE 80 MILLIGRAM(S): 80 TABLET, CHEWABLE ORAL at 18:10

## 2020-10-04 RX ADMIN — Medication 975 MILLIGRAM(S): at 09:56

## 2020-10-04 RX ADMIN — Medication 600 MILLIGRAM(S): at 05:29

## 2020-10-04 RX ADMIN — Medication 975 MILLIGRAM(S): at 10:25

## 2020-10-04 RX ADMIN — Medication 600 MILLIGRAM(S): at 13:00

## 2020-10-04 RX ADMIN — Medication 600 MILLIGRAM(S): at 18:10

## 2020-10-04 RX ADMIN — Medication 1 MILLIGRAM(S): at 13:00

## 2020-10-04 RX ADMIN — Medication 1 TABLET(S): at 13:00

## 2020-10-04 RX ADMIN — ZINC SULFATE TAB 220 MG (50 MG ZINC EQUIVALENT) 220 MILLIGRAM(S): 220 (50 ZN) TAB at 13:00

## 2020-10-04 RX ADMIN — Medication 600 MILLIGRAM(S): at 13:30

## 2020-10-04 RX ADMIN — SIMETHICONE 80 MILLIGRAM(S): 80 TABLET, CHEWABLE ORAL at 05:29

## 2020-10-04 RX ADMIN — Medication 600 MILLIGRAM(S): at 05:56

## 2020-10-04 RX ADMIN — Medication 600 MILLIGRAM(S): at 18:39

## 2020-10-04 NOTE — DISCHARGE NOTE OB - CARE PROVIDER_API CALL
Anthony Chapa  GYNECOLOGIC ONCOLOGY  [address] [place] [id number]  Phone: [phone]  Fax: [phone]  Follow Up Time:

## 2020-10-04 NOTE — DISCHARGE NOTE OB - PLAN OF CARE
Healthy recovery for both mom and baby Nothing in the vagina for 6 weeks (no sex, no tampons, no douching). Avoid tub baths, you may shower.  If you have a fever of 100.4F or greater, severe vaginal bleeding, or severe abdominal pain, call your Ob/Gyn or come to the emergency department immediately.  Please follow up with your provider in 1 week for incision check and in 6 weeks for postpartum visit.

## 2020-10-04 NOTE — DISCHARGE NOTE OB - PATIENT PORTAL LINK FT
You can access the FollowMyHealth Patient Portal offered by [facility] by registering at the following website: [email or web address]. By joining [facility]’s FollowMyHealth portal, you will also be able to view your health information using other applications (apps) compatible with our system.

## 2020-10-04 NOTE — DISCHARGE NOTE OB - CARE PLAN
Principal Discharge DX:	 delivery delivered  Goal:	Healthy recovery for both mom and baby  Assessment and plan of treatment:	Nothing in the vagina for 6 weeks (no sex, no tampons, no douching). Avoid tub baths, you may shower.  If you have a fever of 100.4F or greater, severe vaginal bleeding, or severe abdominal pain, call your Ob/Gyn or come to the emergency department immediately.  Please follow up with your provider in 1 week for incision check and in 6 weeks for postpartum visit.  Secondary Diagnosis:	Postpartum hemorrhage  Goal:	Healthy recovery for both mom and baby  Secondary Diagnosis:	Acute blood loss anemia  Goal:	Healthy recovery for both mom and baby

## 2020-10-04 NOTE — PROGRESS NOTE ADULT - ASSESSMENT
34 y/o P7 s/p repeat C/S for placenta previa, possible focal accreta, POD#4, PPH s/p 6U pRBC, 3FFP, and multiple uterotonics Hemabate x1 intrauterine, Methergine x1, Cytotec 1000mcg and IV venofer x2, s/p B/L uterine artery embolization by IR; total EBL 2900cc, afebrile for >24 hours; currently recovering well  - continue routine postpartum care  - monitor vitals  - monitor bleeding  - continue multivitamin regimen  - DVT prophylaxis: SCDs   - encourage incentive spirometer   - encourage ambulation   - encourage PO hydration   - diet: regular   - anticipate discharge home    Dr. Singh and Dr. Chapa to be made aware

## 2020-10-04 NOTE — DISCHARGE NOTE OB - HOSPITAL COURSE
10-04-20 @ 08:45    HPI:  36 y/o  at 38w0d, ESTHELA 10/14/20, dated by LMP c/w first trimester sonogram, presents for scheduled c/s for marginal previa. Pregnancy complicated with anemia. Patient tested positive for THC in pregnancy, last time she smoked marijuana was 3 months. Patient had an elevated GCT but normal GTT. She has a h/o of blood transfusion with P3 for postpartum hemorrhage for retained placenta. H/o c/s x1 for non reassuring fetal heart rate. Denies any other complications, LOF, vaginal bleeding, contractions, urinary symptoms. Reports good fetal movement. GBS positive    Patient signed BTL, but does not desire it at this time.  (30 Sep 2020 10:21)      PAST MEDICAL & SURGICAL HISTORY:  H/O dilation and curettage     delivery delivered        POST PARTUM COURSE:   complicated with PPH, EBL 2900cc, s/p 6u pRBC, 3 FFP's, multiple uterotonic and uterine artery embolization. Now with stable stable hemoglobin and vital signs. Discharge on POD#4       LABS:                        8.4    9.18  )-----------( 194      ( 03 Oct 2020 05:26 )             25.6       10-02-20 @ 05:22      139  |  108  |  5<L>  ----------------------------<  91  3.5   |  24  |  0.5<L>        Ca    8.1<L>      02 Oct 2020 05:22    TPro  4.1<L>  /  Alb  2.3<L>  /  TBili  0.3  /  DBili  x   /  AST  24  /  ALT  13  /  AlkPhos  68  10-02-20 @ 05:22        Allergies    No Known Allergies    Intolerances

## 2020-10-04 NOTE — DISCHARGE NOTE OB - MATERIALS PROVIDED
Back To Sleep Handout/Tdap Vaccination (VIS Pub Date: 2012)/MMR Vaccination (VIS Pub Date: 2012)/[facility] [place] Screening Program/Vaccinations/Shaken Baby Prevention Handout/Birth Certificate Instructions/[facility] [place] Screen Program

## 2020-10-04 NOTE — PROGRESS NOTE ADULT - SUBJECTIVE AND OBJECTIVE BOX
CC: postpartum    HPI: Patient evaluated at bedside this morning on POD#4, resting comfortable in bed. She reports pain is well controlled with po medications. Patient reports passage of a clot fist size yesterday but denies heavy bleeding. She has been ambulating without assistance, voiding spontaneously, passing gas, tolerating regular diet, breastfeeding and bottle feeding. Had a bowel movement last night. She denies headache, dizziness, chest pain, palpitations, shortness of breath, nausea, vomiting.    Physical Exam:  Vital Signs Last 24 Hrs  T(C): 36.4 (04 Oct 2020 08:16), Max: 36.7 (03 Oct 2020 11:35)  T(F): 97.6 (04 Oct 2020 08:16), Max: 98.1 (03 Oct 2020 11:35)  HR: 71 (04 Oct 2020 08:16) (61 - 79)  BP: 98/61 (04 Oct 2020 08:16) (91/54 - 117/56)  BP(mean): --  RR: 18 (04 Oct 2020 08:16) (18 - 20)  SpO2: --    GA: comfortable in NAD  CV: normal s1s2  lungs: CTAB  Abd: soft, nontender, nondistended, no rebound or guarding, incision clean, dry and intact, uterus firm at midline, fundus below umbilicus  : lochia WNL  Extremities: no swelling or calf tenderness                             8.4    9.18  )-----------( 194      ( 03 Oct 2020 05:26 )             25.6             PT/INR - ( 03 Oct 2020 05:26 )   PT: 11.20 sec;   INR: 0.97 ratio         PTT - ( 03 Oct 2020 05:26 )  PTT:27.9 sec

## 2020-10-04 NOTE — DISCHARGE NOTE OB - MEDICATION SUMMARY - MEDICATIONS TO TAKE
I will START or STAY ON the medications listed below when I get home from the hospital:    ibuprofen 600 mg oral tablet  -- 1 tab(s) by mouth every 6 hours  -- Indication: For pain    acetaminophen 325 mg oral tablet  -- 3 tab(s) by mouth every 6 hours   -- Indication: For pain    ferrous sulfate 325 mg (65 mg elemental iron) oral tablet  -- 1 tab(s) by mouth 3 times a day   -- Check with your doctor before becoming pregnant.  Do not chew, break, or crush.  May discolor urine or feces.    -- Indication: For anemia

## 2020-10-05 VITALS
RESPIRATION RATE: 18 BRPM | HEART RATE: 79 BPM | TEMPERATURE: 99 F | SYSTOLIC BLOOD PRESSURE: 110 MMHG | DIASTOLIC BLOOD PRESSURE: 62 MMHG

## 2020-10-05 RX ADMIN — Medication 600 MILLIGRAM(S): at 07:00

## 2020-10-05 RX ADMIN — Medication 600 MILLIGRAM(S): at 11:36

## 2020-10-05 RX ADMIN — Medication 975 MILLIGRAM(S): at 03:47

## 2020-10-05 RX ADMIN — ZINC SULFATE TAB 220 MG (50 MG ZINC EQUIVALENT) 220 MILLIGRAM(S): 220 (50 ZN) TAB at 14:22

## 2020-10-05 RX ADMIN — Medication 600 MILLIGRAM(S): at 06:29

## 2020-10-05 RX ADMIN — SIMETHICONE 80 MILLIGRAM(S): 80 TABLET, CHEWABLE ORAL at 17:32

## 2020-10-05 RX ADMIN — Medication 2000 UNIT(S): at 14:22

## 2020-10-05 RX ADMIN — SIMETHICONE 80 MILLIGRAM(S): 80 TABLET, CHEWABLE ORAL at 09:05

## 2020-10-05 RX ADMIN — Medication 975 MILLIGRAM(S): at 04:30

## 2020-10-05 RX ADMIN — Medication 500 MILLIGRAM(S): at 14:22

## 2020-10-05 RX ADMIN — Medication 600 MILLIGRAM(S): at 12:06

## 2020-10-05 RX ADMIN — Medication 1 TABLET(S): at 11:36

## 2020-10-05 RX ADMIN — SIMETHICONE 80 MILLIGRAM(S): 80 TABLET, CHEWABLE ORAL at 14:23

## 2020-10-05 RX ADMIN — Medication 1 MILLIGRAM(S): at 14:22

## 2020-10-05 NOTE — PROGRESS NOTE ADULT - SUBJECTIVE AND OBJECTIVE BOX
PGY 4 Post Op c/s note    Pt seen and evaluated at bedside, POD5, recovering well today. No acute events overnight, no complaints this morning. Denies headaches, lightheadedness, CP, SOB. No vaginal bleeding overnight.   Pt is ambulating, tolerating regular diet  Voiding well, passing gas, no BM yet  Breastfeeding    Physical Exam  T(F): 96.6 (05 Oct 2020 03:20), Max: 99.1 (04 Oct 2020 15:50)  HR: 64 (05 Oct 2020 03:20) (57 - 80)  BP: 122/65 (05 Oct 2020 03:20) (98/61 - 122/65)  RR: 18 (05 Oct 2020 03:20) (18 - 18)    Gen: NAD  CVS: RRR, normal S1, S2  Lungs: CTAB  Abdomen: soft, non tender, non distended, +BS  -Incision: staples in palce, C/D/I, no surrounding tenderness or erythema  -Fundus: firm, non tender, non distended.  LE: no edema or tenderness  Lochia: Minimal Rubra    Labs                        8.4    7.19  )-----------( 251      ( 04 Oct 2020 21:58 )             26.0                         8.4    9.18  )-----------( 194      ( 03 Oct 2020 05:26 )             25.6                         8.2    12.87 )-----------( 161      ( 02 Oct 2020 05:22 )             24.6                         8.8    15.17 )-----------( 160      ( 01 Oct 2020 18:15 )             26.4                         8.6    14.19 )-----------( 151      ( 01 Oct 2020 15:00 )             26.3                         7.7    15.64 )-----------( 167      ( 01 Oct 2020 06:00 )             23.6                         8.7    17.39 )-----------( 211      ( 30 Sep 2020 22:20 )             26.2                         10.4   15.57 )-----------( 247      ( 30 Sep 2020 17:45 )             31.8                         11.6   9.13  )-----------( 198      ( 30 Sep 2020 14:30 )             36.4                         10.6   7.96  )-----------( 229      ( 30 Sep 2020 10:29 )             32.8         Meds  acetaminophen   Tablet .. 975 milliGRAM(s) Oral <User Schedule>  ascorbic acid 500 milliGRAM(s) Oral daily  cholecalciferol 2000 Unit(s) Oral daily  dexAMETHasone  Injectable 4 milliGRAM(s) IV Push every 6 hours PRN  diphenhydrAMINE 25 milliGRAM(s) Oral every 6 hours PRN  folic acid 1 milliGRAM(s) Oral daily  HYDROmorphone  Injectable 0.5 milliGRAM(s) IV Push every 10 minutes PRN  ibuprofen  Tablet. 600 milliGRAM(s) Oral every 6 hours  lactated ringers. 1000 milliLiter(s) IV Continuous <Continuous>  lanolin Ointment 1 Application(s) Topical every 6 hours PRN  magnesium hydroxide Suspension 30 milliLiter(s) Oral two times a day PRN  morphine PF Spinal 0.2 milliGRAM(s) IntraThecal. once  naloxone Injectable 0.1 milliGRAM(s) IV Push every 3 minutes PRN  ondansetron Injectable 4 milliGRAM(s) IV Push every 6 hours PRN  oxyCODONE    IR 5 milliGRAM(s) Oral every 3 hours PRN  oxytocin Infusion 333.333 milliUNIT(s)/Min IV Continuous <Continuous>  prenatal multivitamin 1 Tablet(s) Oral daily  simethicone 80 milliGRAM(s) Chew every 4 hours  tranexamic acid IVPB 1000 milliGRAM(s) IV Intermittent once  zinc sulfate 220 milliGRAM(s) Oral daily

## 2020-10-05 NOTE — PROGRESS NOTE ADULT - ASSESSMENT
36 yo P7 POD#5 s/p repeat C/S for placenta previa, possible focal accreta, PPH s/p 4U pRBC, 2FFP, and multiple uterotonics (hemabate x1 intrauterine, methergine x1, cytotec 1000mcg) and s/p B/L uterine artery embolization by IR; total EBL 2900cc,  afebrile for the last 24 hours; currently recovering well  - tylenol/oxycodone PRN for pain management   - monitor vitals, bleeding. If another temp will pursue fever work up.  - encourage incentive spirometer   - encourage ambulation   - encourage PO hydration   - diet: regular   - f/u AM labs   - crossed for additional 2U pRBC   - simethicone   - routine postop care   - DVT prophylaxis: SCDs   - Encourage PO hydration, ambulation, incentive spirometry  - anticipate discharge home today if AM H/H stable   - discussed routine postpartum precautions & care  - discussed incisional and vaginal precautions     Dr. Chapa to be made aware 36 yo P7 POD#5 s/p repeat C/S for placenta previa, possible focal accreta, PPH s/p 4U pRBC, 2FFP, and multiple uterotonics (hemabate x1 intrauterine, methergine x1, cytotec 1000mcg) and s/p B/L uterine artery embolization by IR; total EBL 2900cc,  afebrile for the last 24 hours; currently recovering well  - tylenol/oxycodone PRN for pain management   - monitor vitals, bleeding  - encourage incentive spirometer   - regular diet and activity  - routine postop care   - DVT prophylaxis: SCDs, lovenox   - Encourage PO hydration, ambulation, incentive spirometry  - discharge instructions and precautions given    Dr. Chapa to be made aware

## 2020-10-09 ENCOUNTER — APPOINTMENT (OUTPATIENT)
Dept: OBGYN | Facility: CLINIC | Age: 35
End: 2020-10-09
Payer: MEDICAID

## 2020-10-09 ENCOUNTER — APPOINTMENT (OUTPATIENT)
Dept: OBGYN | Facility: CLINIC | Age: 35
End: 2020-10-09

## 2020-10-09 PROCEDURE — 0503F POSTPARTUM CARE VISIT: CPT

## 2020-10-12 DIAGNOSIS — O44.23 PARTIAL PLACENTA PREVIA NOS OR WITHOUT HEMORRHAGE, THIRD TRIMESTER: ICD-10-CM

## 2020-10-12 DIAGNOSIS — Z3A.38 38 WEEKS GESTATION OF PREGNANCY: ICD-10-CM

## 2020-10-12 DIAGNOSIS — O34.211 MATERNAL CARE FOR LOW TRANSVERSE SCAR FROM PREVIOUS CESAREAN DELIVERY: ICD-10-CM

## 2020-10-12 DIAGNOSIS — F12.90 CANNABIS USE, UNSPECIFIED, UNCOMPLICATED: ICD-10-CM

## 2020-10-12 DIAGNOSIS — D62 ACUTE POSTHEMORRHAGIC ANEMIA: ICD-10-CM

## 2020-10-16 ENCOUNTER — APPOINTMENT (OUTPATIENT)
Dept: OBGYN | Facility: CLINIC | Age: 35
End: 2020-10-16

## 2022-01-03 ENCOUNTER — APPOINTMENT (OUTPATIENT)
Dept: OBGYN | Facility: CLINIC | Age: 37
End: 2022-01-03

## 2022-02-18 ENCOUNTER — APPOINTMENT (OUTPATIENT)
Dept: OBGYN | Facility: CLINIC | Age: 37
End: 2022-02-18

## 2023-06-13 NOTE — OB PROVIDER DELIVERY SUMMARY - NSPPHNORISK_OBGYN_ALL_OB
Quality 226: Preventive Care And Screening: Tobacco Use: Screening And Cessation Intervention: Patient screened for tobacco use and is an ex/non-smoker Quality 130: Documentation Of Current Medications In The Medical Record: Current Medications Documented Quality 431: Preventive Care And Screening: Unhealthy Alcohol Use - Screening: Patient not identified as an unhealthy alcohol user when screened for unhealthy alcohol use using a systematic screening method Detail Level: Detailed Quality 110: Preventive Care And Screening: Influenza Immunization: Influenza Immunization not Administered because Patient Refused. After evaluating the patient it has been determined they are at risk for postpartum hemorrhage.

## 2023-07-20 ENCOUNTER — APPOINTMENT (OUTPATIENT)
Dept: OBGYN | Facility: CLINIC | Age: 38
End: 2023-07-20

## 2023-08-02 ENCOUNTER — APPOINTMENT (OUTPATIENT)
Dept: OBGYN | Facility: CLINIC | Age: 38
End: 2023-08-02
Payer: MEDICAID

## 2023-08-02 VITALS
BODY MASS INDEX: 22.44 KG/M2 | WEIGHT: 143 LBS | OXYGEN SATURATION: 98 % | TEMPERATURE: 98.6 F | DIASTOLIC BLOOD PRESSURE: 60 MMHG | HEIGHT: 67 IN | SYSTOLIC BLOOD PRESSURE: 100 MMHG | HEART RATE: 80 BPM

## 2023-08-02 DIAGNOSIS — Z78.9 OTHER SPECIFIED HEALTH STATUS: ICD-10-CM

## 2023-08-02 DIAGNOSIS — Z01.419 ENCOUNTER FOR GYNECOLOGICAL EXAMINATION (GENERAL) (ROUTINE) W/OUT ABNORMAL FINDINGS: ICD-10-CM

## 2023-08-02 LAB
BILIRUB UR QL STRIP: NORMAL
GLUCOSE UR-MCNC: NORMAL
HCG UR QL: 0.2 EU/DL
HGB UR QL STRIP.AUTO: ABNORMAL
KETONES UR-MCNC: ABNORMAL
LEUKOCYTE ESTERASE UR QL STRIP: NORMAL
NITRITE UR QL STRIP: NORMAL
PH UR STRIP: 6
PROT UR STRIP-MCNC: ABNORMAL
SP GR UR STRIP: 1.03

## 2023-08-02 PROCEDURE — 81003 URINALYSIS AUTO W/O SCOPE: CPT | Mod: QW

## 2023-08-02 PROCEDURE — 99385 PREV VISIT NEW AGE 18-39: CPT

## 2023-08-02 RX ORDER — ELECTROLYTES/DEXTROSE
SOLUTION, ORAL ORAL DAILY
Qty: 90 | Refills: 3 | Status: ACTIVE | COMMUNITY
Start: 2023-08-02 | End: 1900-01-01

## 2023-08-02 NOTE — PLAN
[FreeTextEntry1] : GYN exam/STI testing. Return for results in two weeks, as needed, and for annual GYN exam.

## 2023-08-02 NOTE — COUNSELING
[Nutrition/ Exercise/ Weight Management] : nutrition, exercise, weight management [Vitamins/Supplements] : vitamins/supplements [Sunscreen] : sunscreen [Breast Self Exam] : breast self exam [Bladder Hygiene] : bladder hygiene [Contraception/ Emergency Contraception/ Safe Sexual Practices] : contraception, emergency contraception, safe sexual practices [Confidentiality] : confidentiality [STD (testing, results, tx)] : STD (testing, results, tx)

## 2023-08-02 NOTE — PHYSICAL EXAM
[Chaperone Present] : A chaperone was present in the examining room during all aspects of the physical examination [Appropriately responsive] : appropriately responsive [Alert] : alert [No Acute Distress] : no acute distress [Regular Rate Rhythm] : regular rate rhythm [Clear to Auscultation B/L] : clear to auscultation bilaterally [Soft] : soft [Non-tender] : non-tender [Non-distended] : non-distended [No Lesions] : no lesions [No Mass] : no mass [Oriented x3] : oriented x3 [Examination Of The Breasts] : a normal appearance [No Discharge] : no discharge [No Masses] : no breast masses were palpable [Labia Majora] : normal [Labia Minora] : normal [Normal] : normal [Uterine Adnexae] : normal [Declined] : Patient declined rectal exam

## 2023-08-02 NOTE — HISTORY OF PRESENT ILLNESS
[Y] : Positive pregnancy history [HIV Test offered] : HIV Test offered [Syphilis test offered] : Syphilis test offered [Gonorrhea test offered] : Gonorrhea test offered [Chlamydia test offered] : Chlamydia test offered [Trichomonas test offered] : Trichomonas test offered [HPV test offered] : HPV test offered [Hepatitis B test offered] : Hepatitis B test offered [Hepatitis C test offered] : Hepatitis C test offered [Mammogramdate] : 0 [BoneDensityDate] : 0 [ColonoscopyDate] : 0 [LMPDate] : 7/3/2023 [PGHxTotal] : 2 [[facility] : 2 [FreeTextEntry1] : C/S X 2 [No] : Patient does not have concerns regarding sex [Previously active] : previously active [Men] : men [Patient would like to be screened for STIs] : Patient would like to be screened for STIs

## 2023-08-04 LAB
HBV SURFACE AG SER QL: NONREACTIVE
HCV AB SER QL: NONREACTIVE
HCV S/CO RATIO: 0.25 S/CO
HIV1+2 AB SPEC QL IA.RAPID: NONREACTIVE
T PALLIDUM AB SER QL IA: NEGATIVE

## 2023-08-07 LAB
C TRACH RRNA SPEC QL NAA+PROBE: NOT DETECTED
CYTOLOGY CVX/VAG DOC THIN PREP: NORMAL
N GONORRHOEA RRNA SPEC QL NAA+PROBE: NOT DETECTED
SOURCE AMPLIFICATION: NORMAL
SOURCE AMPLIFICATION: NORMAL
T VAGINALIS RRNA SPEC QL NAA+PROBE: NOT DETECTED

## 2023-08-08 NOTE — CONSULT NOTE ADULT - I HAVE PERSONALLY SEEN, EXAMINED, AND PARTICIPATED IN THE CARE OF THIS PATIENT.  I HAVE REVIEWED ALL PERTINENT CLINICAL INFORMATION, INCLUDING HISTORY, PHYSICAL EXAM, PLAN AND THE MEDICAL/PA/NP STUDENT’S NOTE AND AGREE EXCEPT AS NOTED.
Statement Selected Patient BIBEMS for a diagnosed UTI. Patient also has an elevated glucose reading from the doctor's office 508 BGL at 1100. FS in triage is 449.

## 2023-08-09 LAB — HPV HIGH+LOW RISK DNA PNL CVX: NOT DETECTED

## 2023-10-19 NOTE — OB RN DELIVERY SUMMARY - BABY A: APGAR 1 MIN MUSCLE TONE, DELIVERY
Called pt and gave Jame's recommendation. She stated she is trying to wean off the oxycodone, but is in a lot of pain now, but hoping to not take it as much in the next couple days. Pt verbalized understanding.    (2) well flexed

## 2023-10-23 ENCOUNTER — RX RENEWAL (OUTPATIENT)
Age: 38
End: 2023-10-23

## 2023-11-01 ENCOUNTER — RX RENEWAL (OUTPATIENT)
Age: 38
End: 2023-11-01

## 2023-12-26 ENCOUNTER — RX RENEWAL (OUTPATIENT)
Age: 38
End: 2023-12-26

## 2024-01-10 ENCOUNTER — RX RENEWAL (OUTPATIENT)
Age: 39
End: 2024-01-10

## 2024-04-03 ENCOUNTER — RX RENEWAL (OUTPATIENT)
Age: 39
End: 2024-04-03

## 2024-04-03 RX ORDER — VIT B COMPLEX,VIT C/FOLIC ACID 1000 MCG
1 TABLET ORAL
Qty: 90 | Refills: 0 | Status: ACTIVE | COMMUNITY
Start: 2023-10-23 | End: 1900-01-01

## 2025-02-03 ENCOUNTER — RX RENEWAL (OUTPATIENT)
Age: 40
End: 2025-02-03

## 2025-02-19 NOTE — OB RN PATIENT PROFILE - RUBELLA: DATE, OB PROFILE
Hello please confirm current intsulin doses, thank you.  Electronically signed by Fer Elizalde MD on 2/18/2025 at 7:34 PM       14-Aug-2020

## 2025-07-24 NOTE — OB RN DELIVERY SUMMARY - NS_REGEMAIL_OBGYN_ALL_OB
Copied from CRM #[id number]. Topic: Appointments - Appointment Access  >> Jul 24, 2025  1:39 PM Zana wrote:  Type:  Same Day Appointment Request    Caller is requesting a same day appointment.  Caller declined first available appointment listed below.    Name of Caller:Sherrill Avery  When is the first available appointment?need to be seen today  Symptoms:vertigo  Best Call Back Number:[phone]  Additional Information: [id number] [id number]... patient is dizzy   Above listed  information was sent to the admitting office